# Patient Record
Sex: FEMALE | Race: WHITE | NOT HISPANIC OR LATINO | Employment: OTHER | RURAL
[De-identification: names, ages, dates, MRNs, and addresses within clinical notes are randomized per-mention and may not be internally consistent; named-entity substitution may affect disease eponyms.]

---

## 2016-10-24 LAB — CRC RECOMMENDATION EXT: NORMAL

## 2017-01-04 LAB — BMD RECOMMENDATION EXT: NORMAL

## 2021-01-28 ENCOUNTER — HISTORICAL (OUTPATIENT)
Dept: ADMINISTRATIVE | Facility: HOSPITAL | Age: 71
End: 2021-01-28

## 2021-08-26 RX ORDER — PROPRANOLOL HYDROCHLORIDE 120 MG/1
120 CAPSULE, EXTENDED RELEASE ORAL DAILY
Qty: 90 CAPSULE | Refills: 1 | Status: SHIPPED | OUTPATIENT
Start: 2021-08-26 | End: 2021-11-08 | Stop reason: SDUPTHER

## 2021-08-26 RX ORDER — LEVOTHYROXINE SODIUM 100 UG/1
100 TABLET ORAL DAILY
COMMUNITY
Start: 2021-05-16 | End: 2021-08-26 | Stop reason: SDUPTHER

## 2021-08-26 RX ORDER — PROPRANOLOL HYDROCHLORIDE 120 MG/1
120 CAPSULE, EXTENDED RELEASE ORAL DAILY
COMMUNITY
Start: 2021-05-16 | End: 2021-08-26 | Stop reason: SDUPTHER

## 2021-08-26 RX ORDER — ATORVASTATIN CALCIUM 10 MG/1
10 TABLET, FILM COATED ORAL DAILY
COMMUNITY
Start: 2021-06-21 | End: 2021-08-26 | Stop reason: SDUPTHER

## 2021-08-26 RX ORDER — ATORVASTATIN CALCIUM 10 MG/1
10 TABLET, FILM COATED ORAL DAILY
Qty: 90 TABLET | Refills: 1 | Status: SHIPPED | OUTPATIENT
Start: 2021-08-26 | End: 2021-11-08 | Stop reason: SDUPTHER

## 2021-08-26 RX ORDER — LEVOTHYROXINE SODIUM 100 UG/1
100 TABLET ORAL DAILY
Qty: 90 TABLET | Refills: 1 | Status: SHIPPED | OUTPATIENT
Start: 2021-08-26 | End: 2021-11-08 | Stop reason: SDUPTHER

## 2021-11-08 ENCOUNTER — OFFICE VISIT (OUTPATIENT)
Dept: FAMILY MEDICINE | Facility: CLINIC | Age: 71
End: 2021-11-08
Payer: MEDICARE

## 2021-11-08 VITALS
BODY MASS INDEX: 23.59 KG/M2 | OXYGEN SATURATION: 100 % | SYSTOLIC BLOOD PRESSURE: 122 MMHG | WEIGHT: 155.63 LBS | HEART RATE: 65 BPM | HEIGHT: 68 IN | DIASTOLIC BLOOD PRESSURE: 76 MMHG

## 2021-11-08 DIAGNOSIS — I10 HYPERTENSION, UNSPECIFIED TYPE: Primary | ICD-10-CM

## 2021-11-08 DIAGNOSIS — Z12.11 SCREEN FOR COLON CANCER: ICD-10-CM

## 2021-11-08 DIAGNOSIS — E78.5 HYPERLIPIDEMIA, UNSPECIFIED HYPERLIPIDEMIA TYPE: ICD-10-CM

## 2021-11-08 DIAGNOSIS — E03.9 HYPOTHYROIDISM, UNSPECIFIED TYPE: ICD-10-CM

## 2021-11-08 LAB
ALBUMIN SERPL BCP-MCNC: 4.2 G/DL (ref 3.5–5)
ALBUMIN/GLOB SERPL: 1.2 {RATIO}
ALP SERPL-CCNC: 51 U/L (ref 55–142)
ALT SERPL W P-5'-P-CCNC: 20 U/L (ref 13–56)
ANION GAP SERPL CALCULATED.3IONS-SCNC: 8 MMOL/L (ref 7–16)
AST SERPL W P-5'-P-CCNC: 15 U/L (ref 15–37)
BASOPHILS # BLD AUTO: 0.15 K/UL (ref 0–0.2)
BASOPHILS NFR BLD AUTO: 2.3 % (ref 0–1)
BILIRUB SERPL-MCNC: 0.3 MG/DL (ref 0–1.2)
BUN SERPL-MCNC: 8 MG/DL (ref 7–18)
BUN/CREAT SERPL: 10 (ref 6–20)
CALCIUM SERPL-MCNC: 10.3 MG/DL (ref 8.5–10.1)
CHLORIDE SERPL-SCNC: 104 MMOL/L (ref 98–107)
CHOLEST SERPL-MCNC: 161 MG/DL (ref 0–200)
CHOLEST/HDLC SERPL: 1.7 {RATIO}
CO2 SERPL-SCNC: 31 MMOL/L (ref 21–32)
CREAT SERPL-MCNC: 0.78 MG/DL (ref 0.55–1.02)
DIFFERENTIAL METHOD BLD: ABNORMAL
EOSINOPHIL # BLD AUTO: 0.2 K/UL (ref 0–0.5)
EOSINOPHIL NFR BLD AUTO: 3.1 % (ref 1–4)
ERYTHROCYTE [DISTWIDTH] IN BLOOD BY AUTOMATED COUNT: 12.6 % (ref 11.5–14.5)
GLOBULIN SER-MCNC: 3.5 G/DL (ref 2–4)
GLUCOSE SERPL-MCNC: 96 MG/DL (ref 74–106)
HCT VFR BLD AUTO: 42.5 % (ref 38–47)
HDLC SERPL-MCNC: 97 MG/DL (ref 40–60)
HGB BLD-MCNC: 13.7 G/DL (ref 12–16)
IMM GRANULOCYTES # BLD AUTO: 0.01 K/UL (ref 0–0.04)
IMM GRANULOCYTES NFR BLD: 0.2 % (ref 0–0.4)
LDLC SERPL CALC-MCNC: 48 MG/DL
LDLC/HDLC SERPL: 0.5 {RATIO}
LYMPHOCYTES # BLD AUTO: 1.87 K/UL (ref 1–4.8)
LYMPHOCYTES NFR BLD AUTO: 29.2 % (ref 27–41)
MCH RBC QN AUTO: 30.6 PG (ref 27–31)
MCHC RBC AUTO-ENTMCNC: 32.2 G/DL (ref 32–36)
MCV RBC AUTO: 95.1 FL (ref 80–96)
MONOCYTES # BLD AUTO: 0.58 K/UL (ref 0–0.8)
MONOCYTES NFR BLD AUTO: 9.1 % (ref 2–6)
MPC BLD CALC-MCNC: 12.7 FL (ref 9.4–12.4)
NEUTROPHILS # BLD AUTO: 3.59 K/UL (ref 1.8–7.7)
NEUTROPHILS NFR BLD AUTO: 56.1 % (ref 53–65)
NONHDLC SERPL-MCNC: 64 MG/DL
NRBC # BLD AUTO: 0 X10E3/UL
NRBC, AUTO (.00): 0 %
PLATELET # BLD AUTO: 283 K/UL (ref 150–400)
POTASSIUM SERPL-SCNC: 5 MMOL/L (ref 3.5–5.1)
PROT SERPL-MCNC: 7.7 G/DL (ref 6.4–8.2)
RBC # BLD AUTO: 4.47 M/UL (ref 4.2–5.4)
SODIUM SERPL-SCNC: 138 MMOL/L (ref 136–145)
TRIGL SERPL-MCNC: 78 MG/DL (ref 35–150)
VLDLC SERPL-MCNC: 16 MG/DL
WBC # BLD AUTO: 6.4 K/UL (ref 4.5–11)

## 2021-11-08 PROCEDURE — 82570 ASSAY OF URINE CREATININE: CPT | Mod: ,,, | Performed by: CLINICAL MEDICAL LABORATORY

## 2021-11-08 PROCEDURE — 80053 COMPREHENSIVE METABOLIC PANEL: ICD-10-PCS | Mod: ,,, | Performed by: CLINICAL MEDICAL LABORATORY

## 2021-11-08 PROCEDURE — 82043 UR ALBUMIN QUANTITATIVE: CPT | Mod: ,,, | Performed by: CLINICAL MEDICAL LABORATORY

## 2021-11-08 PROCEDURE — 80061 LIPID PANEL: CPT | Mod: ,,, | Performed by: CLINICAL MEDICAL LABORATORY

## 2021-11-08 PROCEDURE — 85025 CBC WITH DIFFERENTIAL: ICD-10-PCS | Mod: ,,, | Performed by: CLINICAL MEDICAL LABORATORY

## 2021-11-08 PROCEDURE — 85025 COMPLETE CBC W/AUTO DIFF WBC: CPT | Mod: ,,, | Performed by: CLINICAL MEDICAL LABORATORY

## 2021-11-08 PROCEDURE — 99214 PR OFFICE/OUTPT VISIT, EST, LEVL IV, 30-39 MIN: ICD-10-PCS | Mod: ,,, | Performed by: NURSE PRACTITIONER

## 2021-11-08 PROCEDURE — 82570 MICROALBUMIN / CREATININE RATIO URINE: ICD-10-PCS | Mod: ,,, | Performed by: CLINICAL MEDICAL LABORATORY

## 2021-11-08 PROCEDURE — 80061 LIPID PANEL: ICD-10-PCS | Mod: ,,, | Performed by: CLINICAL MEDICAL LABORATORY

## 2021-11-08 PROCEDURE — 99214 OFFICE O/P EST MOD 30 MIN: CPT | Mod: ,,, | Performed by: NURSE PRACTITIONER

## 2021-11-08 PROCEDURE — 82043 MICROALBUMIN / CREATININE RATIO URINE: ICD-10-PCS | Mod: ,,, | Performed by: CLINICAL MEDICAL LABORATORY

## 2021-11-08 PROCEDURE — 80053 COMPREHEN METABOLIC PANEL: CPT | Mod: ,,, | Performed by: CLINICAL MEDICAL LABORATORY

## 2021-11-08 RX ORDER — SUMATRIPTAN SUCCINATE 100 MG/1
TABLET ORAL
COMMUNITY
Start: 2021-11-03 | End: 2021-11-08 | Stop reason: SDUPTHER

## 2021-11-08 RX ORDER — ERGOCALCIFEROL 1.25 MG/1
50000 CAPSULE ORAL
Qty: 12 CAPSULE | Refills: 1 | Status: SHIPPED | OUTPATIENT
Start: 2021-11-08 | End: 2022-01-01 | Stop reason: SDUPTHER

## 2021-11-08 RX ORDER — ATORVASTATIN CALCIUM 10 MG/1
10 TABLET, FILM COATED ORAL DAILY
Qty: 90 TABLET | Refills: 1 | Status: SHIPPED | OUTPATIENT
Start: 2021-11-08 | End: 2022-04-06 | Stop reason: SDUPTHER

## 2021-11-08 RX ORDER — SUMATRIPTAN SUCCINATE 100 MG/1
100 TABLET ORAL
Qty: 18 TABLET | Refills: 5 | Status: SHIPPED | OUTPATIENT
Start: 2021-11-08 | End: 2022-01-31

## 2021-11-08 RX ORDER — LEVOTHYROXINE SODIUM 100 UG/1
100 TABLET ORAL DAILY
Qty: 90 TABLET | Refills: 1 | Status: SHIPPED | OUTPATIENT
Start: 2021-11-08 | End: 2022-04-06 | Stop reason: SDUPTHER

## 2021-11-08 RX ORDER — PROPRANOLOL HYDROCHLORIDE 120 MG/1
120 CAPSULE, EXTENDED RELEASE ORAL DAILY
Qty: 90 CAPSULE | Refills: 1 | Status: SHIPPED | OUTPATIENT
Start: 2021-11-08 | End: 2022-04-06 | Stop reason: SDUPTHER

## 2021-11-09 LAB
CREAT UR-MCNC: 14 MG/DL (ref 28–219)
MICROALBUMIN UR-MCNC: <0.5 MG/DL (ref 0–2.8)
MICROALBUMIN/CREAT RATIO PNL UR: <35.7 MG/G (ref 0–30)

## 2021-12-14 ENCOUNTER — TELEPHONE (OUTPATIENT)
Dept: FAMILY MEDICINE | Facility: CLINIC | Age: 71
End: 2021-12-14
Payer: MEDICARE

## 2021-12-14 NOTE — TELEPHONE ENCOUNTER
----- Message from Shaye Castellon RN sent at 12/13/2021 12:29 PM CST -----    ----- Message -----  From: Nova Diggs  Sent: 12/13/2021  10:28 AM CST  To: Aidan LOU Staff    Pt isnt due for screening colonoscopy until 10/26.

## 2022-02-01 ENCOUNTER — HOSPITAL ENCOUNTER (OUTPATIENT)
Dept: RADIOLOGY | Facility: HOSPITAL | Age: 72
Discharge: HOME | End: 2022-02-01
Payer: MEDICARE

## 2022-02-01 DIAGNOSIS — Z12.31 VISIT FOR SCREENING MAMMOGRAM: ICD-10-CM

## 2022-02-01 PROCEDURE — 77067 SCR MAMMO BI INCL CAD: CPT | Mod: TC

## 2022-03-11 DIAGNOSIS — Z71.89 COMPLEX CARE COORDINATION: ICD-10-CM

## 2022-04-06 RX ORDER — ERGOCALCIFEROL 1.25 MG/1
50000 CAPSULE ORAL
Qty: 12 CAPSULE | Refills: 1 | Status: SHIPPED | OUTPATIENT
Start: 2022-04-06 | End: 2022-05-10 | Stop reason: SDUPTHER

## 2022-04-06 RX ORDER — PROPRANOLOL HYDROCHLORIDE 120 MG/1
120 CAPSULE, EXTENDED RELEASE ORAL DAILY
Qty: 90 CAPSULE | Refills: 1 | Status: SHIPPED | OUTPATIENT
Start: 2022-04-06 | End: 2022-05-10 | Stop reason: SDUPTHER

## 2022-04-06 RX ORDER — LEVOTHYROXINE SODIUM 100 UG/1
100 TABLET ORAL DAILY
Qty: 90 TABLET | Refills: 1 | Status: SHIPPED | OUTPATIENT
Start: 2022-04-06 | End: 2022-05-10 | Stop reason: SDUPTHER

## 2022-04-06 RX ORDER — ATORVASTATIN CALCIUM 10 MG/1
10 TABLET, FILM COATED ORAL DAILY
Qty: 90 TABLET | Refills: 1 | Status: SHIPPED | OUTPATIENT
Start: 2022-04-06 | End: 2022-05-05 | Stop reason: SDUPTHER

## 2022-04-25 ENCOUNTER — PATIENT OUTREACH (OUTPATIENT)
Dept: FAMILY MEDICINE | Facility: CLINIC | Age: 72
End: 2022-04-25
Payer: MEDICARE

## 2022-04-30 ENCOUNTER — EXTERNAL CHRONIC CARE MANAGEMENT (OUTPATIENT)
Dept: FAMILY MEDICINE | Facility: CLINIC | Age: 72
End: 2022-04-30
Payer: MEDICARE

## 2022-04-30 PROCEDURE — G0511 CCM/BHI BY RHC/FQHC 20MIN MO: HCPCS | Mod: ,,, | Performed by: NURSE PRACTITIONER

## 2022-04-30 PROCEDURE — G0511 PR CHRONIC CARE MGMT, RHC OR FQHC ONLY, 20 MINS OR MORE: ICD-10-PCS | Mod: ,,, | Performed by: NURSE PRACTITIONER

## 2022-05-10 ENCOUNTER — OFFICE VISIT (OUTPATIENT)
Dept: FAMILY MEDICINE | Facility: CLINIC | Age: 72
End: 2022-05-10
Payer: MEDICARE

## 2022-05-10 VITALS
SYSTOLIC BLOOD PRESSURE: 110 MMHG | OXYGEN SATURATION: 96 % | RESPIRATION RATE: 20 BRPM | WEIGHT: 146 LBS | DIASTOLIC BLOOD PRESSURE: 80 MMHG | HEIGHT: 68 IN | HEART RATE: 64 BPM | BODY MASS INDEX: 22.13 KG/M2

## 2022-05-10 DIAGNOSIS — Z87.891 PERSONAL HISTORY OF NICOTINE DEPENDENCE: ICD-10-CM

## 2022-05-10 DIAGNOSIS — Z00.00 ENCOUNTER FOR PREVENTIVE HEALTH EXAMINATION: ICD-10-CM

## 2022-05-10 DIAGNOSIS — Z90.710 H/O TOTAL HYSTERECTOMY: ICD-10-CM

## 2022-05-10 DIAGNOSIS — I10 HYPERTENSION, UNSPECIFIED TYPE: Primary | ICD-10-CM

## 2022-05-10 DIAGNOSIS — E03.9 HYPOTHYROIDISM, UNSPECIFIED TYPE: ICD-10-CM

## 2022-05-10 DIAGNOSIS — Z72.0 TOBACCO USE: ICD-10-CM

## 2022-05-10 DIAGNOSIS — E78.5 HYPERLIPIDEMIA, UNSPECIFIED HYPERLIPIDEMIA TYPE: ICD-10-CM

## 2022-05-10 DIAGNOSIS — Z11.59 SCREENING FOR VIRAL DISEASE: ICD-10-CM

## 2022-05-10 DIAGNOSIS — M85.89 OTHER SPECIFIED DISORDERS OF BONE DENSITY AND STRUCTURE, MULTIPLE SITES: ICD-10-CM

## 2022-05-10 PROCEDURE — G0439 PR MEDICARE ANNUAL WELLNESS SUBSEQUENT VISIT: ICD-10-PCS | Mod: ,,, | Performed by: NURSE PRACTITIONER

## 2022-05-10 PROCEDURE — G0439 PPPS, SUBSEQ VISIT: HCPCS | Mod: ,,, | Performed by: NURSE PRACTITIONER

## 2022-05-10 RX ORDER — ERGOCALCIFEROL 1.25 MG/1
50000 CAPSULE ORAL
Qty: 12 CAPSULE | Refills: 1 | Status: SHIPPED | OUTPATIENT
Start: 2022-05-10 | End: 2022-10-24 | Stop reason: SDUPTHER

## 2022-05-10 RX ORDER — PROPRANOLOL HYDROCHLORIDE 120 MG/1
120 CAPSULE, EXTENDED RELEASE ORAL DAILY
Qty: 90 CAPSULE | Refills: 1 | Status: SHIPPED | OUTPATIENT
Start: 2022-05-10 | End: 2022-11-16 | Stop reason: SDUPTHER

## 2022-05-10 RX ORDER — LEVOTHYROXINE SODIUM 100 UG/1
100 TABLET ORAL DAILY
Qty: 90 TABLET | Refills: 1 | Status: SHIPPED | OUTPATIENT
Start: 2022-05-10 | End: 2022-11-16 | Stop reason: SDUPTHER

## 2022-05-10 RX ORDER — ATORVASTATIN CALCIUM 10 MG/1
10 TABLET, FILM COATED ORAL DAILY
Qty: 90 TABLET | Refills: 1 | Status: SHIPPED | OUTPATIENT
Start: 2022-05-10 | End: 2022-11-03

## 2022-05-10 RX ORDER — SUMATRIPTAN SUCCINATE 100 MG/1
TABLET ORAL
Qty: 18 TABLET | Refills: 0 | Status: SHIPPED | OUTPATIENT
Start: 2022-05-10 | End: 2022-06-29 | Stop reason: SDUPTHER

## 2022-05-10 NOTE — PROGRESS NOTES
[place] [facility]       PATIENT NAME: Norma Lopez   : 1950    AGE: 72 y.o. DATE: 2022   MRN: [id number]        Reason for Visit / Chief Complaint: Medicare AWV Follow Up (MEDICARE WELLNESS SUBSEQUENT VISIT)        Norma Lopez presents for an Subsequent Medicare AWV today.     The following components were reviewed and updated:    Medical/Social/Family History:  Past Medical History:   Diagnosis Date    Hypertension     Migraines         Family History   Problem Relation Age of Onset    Diabetes Mother     Stroke Father     Cancer Brother         Social History     Tobacco Use   Smoking Status Former Smoker    Packs/day: 0.50    Years: 49.00    Pack years: 24.50   Smokeless Tobacco Never Used   Tobacco Comment    Quit 9 years      Social History     Substance and Sexual Activity   Alcohol Use Not Currently    Comment: Quit 30 years ago       Family History   Problem Relation Age of Onset    Diabetes Mother     Stroke Father     Cancer Brother        Past Surgical History:   Procedure Laterality Date    HYSTERECTOMY           · Allergies and Current Medications   Review of patient's allergies indicates:  No Known Allergies    Current Outpatient Medications:     atorvastatin (LIPITOR) 10 MG tablet, Take 1 tablet (10 mg total) by mouth once daily., Disp: 90 tablet, Rfl: 1    ergocalciferol (ERGOCALCIFEROL) 50,000 unit Cap, Take 1 capsule (50,000 Units total) by mouth every 7 days., Disp: 12 capsule, Rfl: 1    levothyroxine (SYNTHROID) 100 MCG tablet, Take 1 tablet (100 mcg total) by mouth once daily., Disp: 90 tablet, Rfl: 1    propranoloL (INDERAL LA) 120 MG 24 hr capsule, Take 1 capsule (120 mg total) by mouth once daily., Disp: 90 capsule, Rfl: 1    sumatriptan (IMITREX) 100 MG tablet, TAKE ONE TABLET BY MOUTH AT ONSET OF MIGRAINE. IF SYMPTOMS PERSIST, A SECOND DOSE MAY BE TAKEN IN 2 HOURS IF NEEDED., Disp: 18 tablet, Rfl:  0    · Health Risk Assessment   Fall Risk: No   Obesity: BMI 22.20     Advance Directive:   X  Patient has advanced directives written and agrees to provide copies to the institution.   Depression: PHQ9 -0    HTN: 110/80   T2DM: N/A    Tobacco use: Denies tobacco use currently. Past tobacco user  STI: Not at risk    Alcohol misuse: Denies alcohol use Cage Score: N/A   Statin Use: Continue statin use. Encouraged heart healthy diet & exercise   Mini Co/      · Health Risk Assessment  What is your age?: 70-79  Are you male or female?: Female  During the past four weeks, how much have you been bothered by emotional problems such as feeling anxious, depressed, irritable, sad, or downhearted and blue?: Not at all  During the past five weeks, has your physical and/or emotional health limited your social activities with family, friends, neighbors, or groups?: Slightly  During the past four weeks, how much bodily pain have you generally had?: No pain  During the past four weeks, was someone available to help if you needed and wanted help?: Yes, as much as I wanted  During the past four weeks, what was the hardest physical activity you could do for at least two minutes?: Moderate  Can you get to places out of walking distance without help?  (For example, can you travel alone on buses or taxis, or drive your own car?): Yes  Can you go shopping for groceries or clothes without someone's help?: Yes  Can you prepare your own meals?: Yes  Can you do your own housework without help?: Yes  Because of any health problems, do you need the help of another person with your personal care needs such as eating, bathing, dressing, or getting around the house?: No  Can you handle your own money without help?: Yes  During the past four weeks, how would you rate your health in general?: Very good  How have things been going for you during the past four weeks?: Pretty well  Are you having difficulties driving your car?: No  Do you always  fasten your seat belt when you are in a car?: Yes, usually  How often in the past four weeks have you been bothered by falling or dizzy when standing up?: Never  How often in the past four weeks have you been bothered by sexual problems?: Never  How often in the past four weeks have you been bothered by trouble eating well?: Never  How often in the past four weeks have you been bothered by teeth or denture problems?: Never  How often in the past four weeks have you been bothered with problems using the telephone?: Never  How often in the past four weeks have you been bothered by tiredness or fatigue?: Never  Have you fallen two or more times in the past year?: No  Are you afraid of falling?: No  Are you a smoker?: No  During the past four weeks, how many drinks of wine, beer, or other alcoholic beverages did you have?: No alcohol at all  Do you exercise for about 20 minutes three or more days a week?: No, I usually do not exercise this much  Have you been given any information to help you with hazards in your house that might hurt you?: Yes  Have you been given any information to help you with keeping track of your medications?: Yes  How often do you have trouble taking medicines the way you've been told to take them?: I always take them as prescribed  How confident are you that you can control and manage most of your health problems?: Very confident  What is your race? (Check all that apply.):     · Health Maintenance   Last eye exam: 2 years ago with Dr. Almaguer   Last CV screen with lipids: 11/08/2021   Diabetes screening with fasting glucose or A1c: 11/08/2021   Colonoscopy: 10/24/2016- Repeat in 10 years   Flu Vaccine: 10/04/2021   Pneumonia vaccines: 07/20/2016; 11/07/2013   COVID vaccine: 02/26/2021; 03/26/2021; 11/01/2021; 04/04/2022   Hep B vaccine: Not at risk   DEXA: 01/04/2017- Normal. Due again now. Discussed with pt & ordered   Last pap/pelvic: Total hysterectomy  Last Mammogram: 02/01/2022-  Benign   Last PSA screen: N/A   AAA screening: N/A (once in lifetime for males 65-75 who have smoked > 100 cigarettes in lifetime)  HIV Screeing: N/A  Hepatitis C Screen: Eligible. See standing order for next office visit   Low Dose CT Scan: Eligible. Quit 9 years ago (< than 15). She smoked 0.5 ppd x 49 years= 24.5 pack/year history.  Discussed with pt    Counseling and Shared Decision Making requirements:   * I have discussed the information and determined that the patient meets all of the criteria   *I have counseled the patient on the importance of smoking cessation   *I have provided to the patient smoking cessation counseling / abstinence counseling and provided patient with ways on how to quit   *Previous smoking cessation interventions include N/A .   *I have discussed with the patient the importance of annual screening if the patient continues to meet the above criteria.   * The patient has the following comorbid conditions that I am aware of that may affect screening, further testing, or treatment:     Health Maintenance Topics with due status: Not Due       Topic Last Completion Date    Colorectal Cancer Screening 10/24/2016    Lipid Panel 11/08/2021    Mammogram 02/01/2022     Health Maintenance Due   Topic Date Due    Hepatitis C Screening  Never done    DEXA Scan  01/04/2022        Incontinence  Bowel: No  Bladder: No    Lab results available in Epic or see dates from [facility] above:   Lab Results   Component Value Date    CHOL 161 11/08/2021    CHOL 171 04/21/2021     Lab Results   Component Value Date    HDL 97 (H) 11/08/2021    HDL 97 (H) 04/21/2021     Lab Results   Component Value Date    LDLCALC 48 11/08/2021    LDLCALC 59 04/21/2021     Lab Results   Component Value Date    TRIG 78 11/08/2021    TRIG 77 04/21/2021     Lab Results   Component Value Date    CHOLHDL 1.7 11/08/2021    CHOLHDL 1.8 04/21/2021       No results found for: LABA1C, HGBA1C    Sodium   Date Value Ref Range Status   11/08/2021  "138 136 - 145 mmol/L Final     Potassium   Date Value Ref Range Status   11/08/2021 5.0 3.5 - 5.1 mmol/L Final     Chloride   Date Value Ref Range Status   11/08/2021 104 98 - 107 mmol/L Final     CO2   Date Value Ref Range Status   11/08/2021 31 21 - 32 mmol/L Final     Glucose   Date Value Ref Range Status   11/08/2021 96 74 - 106 mg/dL Final     BUN   Date Value Ref Range Status   11/08/2021 8 7 - 18 mg/dL Final     Creatinine   Date Value Ref Range Status   11/08/2021 0.78 0.55 - 1.02 mg/dL Final     Calcium   Date Value Ref Range Status   11/08/2021 10.3 (H) 8.5 - 10.1 mg/dL Final     Total Protein   Date Value Ref Range Status   11/08/2021 7.7 6.4 - 8.2 g/dL Final     Albumin   Date Value Ref Range Status   11/08/2021 4.2 3.5 - 5.0 g/dL Final     Bilirubin, Total   Date Value Ref Range Status   11/08/2021 0.3 0.0 - 1.2 mg/dL Final     Alk Phos   Date Value Ref Range Status   11/08/2021 51 (L) 55 - 142 U/L Final     AST   Date Value Ref Range Status   11/08/2021 15 15 - 37 U/L Final     ALT   Date Value Ref Range Status   11/08/2021 20 13 - 56 U/L Final     Anion Gap   Date Value Ref Range Status   11/08/2021 8 7 - 16 mmol/L Final     eGFR   Date Value Ref Range Status   11/08/2021 77 >=60 mL/min/1.73m² Final         No results found for: PSA (Delete this line if female pt)        · Care Team   PCP: Janet Bermudez DNP    Eye specialist: Dr. Almaguer       **See Completed Assessments for Annual Wellness visit within the encounter summary    The following assessments were completed & reviewed:  · Depression Screening  · Cognitive function Screening  · Timed Get Up Test  · Whisper Test  · Vision Screen  · Health Risk Assessment  · Checklist of ADLs and IADLs      Objective  Vitals:    05/10/22 1440   BP: 110/80   Pulse: 64   Resp: 20   SpO2: 96%   Weight: 66.2 kg (146 lb)   Height: 5' 8" (1.727 m)   PainSc: 0-No pain      Body mass index is 22.2 kg/m².  Ideal body weight: 63.9 kg (140 lb 14 oz)       Physical " Exam      Assessment:     1. Screening for viral disease  - Hepatitis C Antibody; Future    2. Other specified disorders of bone density and structure, multiple sites  - DXA Bone Density Spine And Hip; Future    3. Hypertension, unspecified type    4. Hyperlipidemia, unspecified hyperlipidemia type    5. Hypothyroidism, unspecified type    6. BMI 22.0-22.9, adult    7. Encounter for preventive health examination    8. Tobacco use  - CT Chest Lung Screening Low Dose; Future    9. Personal history of nicotine dependence   - CT Chest Lung Screening Low Dose; Future         Plan:    Referrals/Orders:  Dexascan, Low Dose Lung CT Screen, Boostrix     Advised to call office if does not hear from anyone with referral appt within 2-3 weeks to check on status of referral. Voiced understanding.    Keep current on appts & continue medications as ordered. Go to pharmacy to get TDAP vaccine      Discussed and provided with a screening schedule and personal prevention plan in accordance with USPSTF age appropriate recommendations and Medicare screening guidelines.   Education, counseling, and referrals were provided as needed.  After Visit Summary printed and given to patient which includes written education and a list of any referrals if indicated. All education discussed with patient & handouts given to patient.      F/u plan for yearly AWV.

## 2022-05-10 NOTE — PATIENT INSTRUCTIONS
Counseling and Referral of Other Preventative  (Italic type indicates deductible and co-insurance are waived)    Patient Name: Norma Lopez  Today's Date: 5/10/2022    Health Maintenance       Date Due Completion Date    Hepatitis C Screening Never done ---    Shingles Vaccine (1 of 2) Never done ---    DEXA Scan 01/04/2022 1/4/2017    TETANUS VACCINE 05/10/2023 (Originally 3/28/1968) ---    Mammogram 02/01/2023 2/1/2022    Colorectal Cancer Screening 10/24/2026 10/24/2016    Lipid Panel 11/08/2026 11/8/2021        Orders Placed This Encounter   Procedures    DXA Bone Density Spine And Hip    Hepatitis C Antibody     The following information is provided to all patients.  This information is to help you find resources for any of the problems found today that may be affecting your health:                Living healthy guide: www.[facility].[place].gov      Understanding Diabetes: www.diabetes.org      Eating healthy: www.cdc.gov/healthyweight      CDC home safety checklist: www.cdc.gov/steadi/patient.html      Agency on Aging: www.goea.[place].[facility] [address] [facility]      Alcoholics anonymous (AA): www.aa.org      Physical Activity: www.jennie.nih.gov/wp5pczt      Tobacco use: www.quitwithusla.org

## 2022-05-31 ENCOUNTER — EXTERNAL CHRONIC CARE MANAGEMENT (OUTPATIENT)
Dept: FAMILY MEDICINE | Facility: CLINIC | Age: 72
End: 2022-05-31
Payer: MEDICARE

## 2022-05-31 PROCEDURE — G0511 CCM/BHI BY RHC/FQHC 20MIN MO: HCPCS | Mod: ,,, | Performed by: NURSE PRACTITIONER

## 2022-05-31 PROCEDURE — G0511 PR CHRONIC CARE MGMT, RHC OR FQHC ONLY, 20 MINS OR MORE: ICD-10-PCS | Mod: ,,, | Performed by: NURSE PRACTITIONER

## 2022-06-09 ENCOUNTER — HOSPITAL ENCOUNTER (OUTPATIENT)
Dept: RADIOLOGY | Facility: HOSPITAL | Age: 72
Discharge: HOME | End: 2022-06-09
Attending: NURSE PRACTITIONER
Payer: MEDICARE

## 2022-06-09 DIAGNOSIS — M85.89 OTHER SPECIFIED DISORDERS OF BONE DENSITY AND STRUCTURE, MULTIPLE SITES: ICD-10-CM

## 2022-06-09 PROCEDURE — 77080 DEXA BONE DENSITY SPINE HIP: ICD-10-PCS | Mod: 26,,, | Performed by: RADIOLOGY

## 2022-06-09 PROCEDURE — 77080 DXA BONE DENSITY AXIAL: CPT | Mod: 26,,, | Performed by: RADIOLOGY

## 2022-06-09 PROCEDURE — 77080 DXA BONE DENSITY AXIAL: CPT | Mod: TC

## 2022-06-29 ENCOUNTER — OFFICE VISIT (OUTPATIENT)
Dept: FAMILY MEDICINE | Facility: CLINIC | Age: 72
End: 2022-06-29
Payer: MEDICARE

## 2022-06-29 VITALS
OXYGEN SATURATION: 100 % | HEART RATE: 64 BPM | BODY MASS INDEX: 22.07 KG/M2 | HEIGHT: 68 IN | WEIGHT: 145.63 LBS | TEMPERATURE: 97 F | SYSTOLIC BLOOD PRESSURE: 149 MMHG | DIASTOLIC BLOOD PRESSURE: 64 MMHG

## 2022-06-29 DIAGNOSIS — R05.9 COUGH: ICD-10-CM

## 2022-06-29 DIAGNOSIS — J32.9 SINUSITIS, UNSPECIFIED CHRONICITY, UNSPECIFIED LOCATION: Primary | ICD-10-CM

## 2022-06-29 DIAGNOSIS — J02.9 SORE THROAT: ICD-10-CM

## 2022-06-29 LAB
CTP QC/QA: YES
FLUAV AG NPH QL: NEGATIVE
FLUBV AG NPH QL: NEGATIVE
SARS-COV-2 AG RESP QL IA.RAPID: NEGATIVE

## 2022-06-29 PROCEDURE — 87428 SARSCOV & INF VIR A&B AG IA: CPT | Mod: RHCUB | Performed by: NURSE PRACTITIONER

## 2022-06-29 PROCEDURE — 99213 OFFICE O/P EST LOW 20 MIN: CPT | Mod: ,,, | Performed by: NURSE PRACTITIONER

## 2022-06-29 PROCEDURE — 99213 PR OFFICE/OUTPT VISIT, EST, LEVL III, 20-29 MIN: ICD-10-PCS | Mod: ,,, | Performed by: NURSE PRACTITIONER

## 2022-06-29 PROCEDURE — 96372 THER/PROPH/DIAG INJ SC/IM: CPT | Mod: ,,, | Performed by: NURSE PRACTITIONER

## 2022-06-29 PROCEDURE — 96372 PR INJECTION,THERAP/PROPH/DIAG2ST, IM OR SUBCUT: ICD-10-PCS | Mod: ,,, | Performed by: NURSE PRACTITIONER

## 2022-06-29 RX ORDER — CEFDINIR 300 MG/1
300 CAPSULE ORAL 2 TIMES DAILY
Qty: 14 CAPSULE | Refills: 0 | Status: SHIPPED | OUTPATIENT
Start: 2022-06-29 | End: 2022-07-06

## 2022-06-29 RX ORDER — CEFTRIAXONE 1 G/1
1 INJECTION, POWDER, FOR SOLUTION INTRAMUSCULAR; INTRAVENOUS
Status: COMPLETED | OUTPATIENT
Start: 2022-06-29 | End: 2022-06-29

## 2022-06-29 RX ORDER — AZITHROMYCIN 250 MG/1
TABLET, FILM COATED ORAL
Qty: 6 TABLET | Refills: 0 | Status: SHIPPED | OUTPATIENT
Start: 2022-06-29 | End: 2022-07-14 | Stop reason: SDUPTHER

## 2022-06-29 RX ORDER — METHYLPREDNISOLONE 4 MG/1
TABLET ORAL
Qty: 21 TABLET | Refills: 0 | Status: SHIPPED | OUTPATIENT
Start: 2022-06-29 | End: 2022-07-14 | Stop reason: SDUPTHER

## 2022-06-29 RX ORDER — BENZONATATE 100 MG/1
100 CAPSULE ORAL 3 TIMES DAILY PRN
Qty: 30 CAPSULE | Refills: 0 | Status: SHIPPED | OUTPATIENT
Start: 2022-06-29 | End: 2022-07-09

## 2022-06-29 RX ORDER — SUMATRIPTAN SUCCINATE 100 MG/1
TABLET ORAL
Qty: 18 TABLET | Refills: 6 | Status: SHIPPED | OUTPATIENT
Start: 2022-06-29 | End: 2022-07-23 | Stop reason: SDUPTHER

## 2022-06-29 RX ORDER — BETAMETHASONE SODIUM PHOSPHATE AND BETAMETHASONE ACETATE 3; 3 MG/ML; MG/ML
6 INJECTION, SUSPENSION INTRA-ARTICULAR; INTRALESIONAL; INTRAMUSCULAR; SOFT TISSUE
Status: COMPLETED | OUTPATIENT
Start: 2022-06-29 | End: 2022-06-29

## 2022-06-29 RX ADMIN — CEFTRIAXONE 1 G: 1 INJECTION, POWDER, FOR SOLUTION INTRAMUSCULAR; INTRAVENOUS at 11:06

## 2022-06-29 RX ADMIN — BETAMETHASONE SODIUM PHOSPHATE AND BETAMETHASONE ACETATE 6 MG: 3; 3 INJECTION, SUSPENSION INTRA-ARTICULAR; INTRALESIONAL; INTRAMUSCULAR; SOFT TISSUE at 11:06

## 2022-06-29 NOTE — PROGRESS NOTES
Janet Bermudez DNP   [address] [place] [address]     PATIENT NAME: Norma Lopez  : 1950  DATE: 22  MRN: [id number]      Billing Provider: Janet Bermudez DNP  Level of Service:   Patient PCP Information     Provider PCP Type    Janet Bermudez DNP General          Reason for Visit / Chief Complaint: Nasal Congestion, Cough, and chest discomfort       Update PCP  Update Chief Complaint         History of Present Illness / Problem Focused Workflow     Norma Lopez presents to the clinic with Nasal Congestion, Cough, and chest discomfort     HPI    Review of Systems     Review of Systems     Medical / Social / Family History     Past Medical History:   Diagnosis Date    Hypertension     Migraines        Past Surgical History:   Procedure Laterality Date    HYSTERECTOMY         Social History  Ms.  reports that she has quit smoking. She has a 24.50 pack-year smoking history. She has never used smokeless tobacco. She reports previous alcohol use. She reports that she does not use drugs.    Family History  Ms.'s family history includes Cancer in her brother; Diabetes in her mother; Stroke in her father.    Medications and Allergies     Medications  Outpatient Medications Marked as Taking for the 22 encounter (Office Visit) with Janet Bermudez DNP   Medication Sig Dispense Refill    atorvastatin (LIPITOR) 10 MG tablet Take 1 tablet (10 mg total) by mouth once daily. 90 tablet 1    ergocalciferol (ERGOCALCIFEROL) 50,000 unit Cap Take 1 capsule (50,000 Units total) by mouth every 7 days. 12 capsule 1    levothyroxine (SYNTHROID) 100 MCG tablet Take 1 tablet (100 mcg total) by mouth once daily. 90 tablet 1    propranoloL (INDERAL LA) 120 MG 24 hr capsule Take 1 capsule (120 mg total) by mouth once daily. 90 capsule 1    [DISCONTINUED] sumatriptan (IMITREX) 100 MG tablet TAKE ONE TABLET BY MOUTH AT ONSET OF MIGRAINE. IF SYMPTOMS PERSIST, A SECOND DOSE MAY BE TAKEN IN  2 HOURS IF NEEDED. 18 tablet 0     Current Facility-Administered Medications for the 6/29/22 encounter (Office Visit) with Janet Bermudez DNP   Medication Dose Route Frequency Provider Last Rate Last Admin    [COMPLETED] betamethasone acetate-betamethasone sodium phosphate injection 6 mg  6 mg Intramuscular 1 time in Clinic/HOD Janet Bermudez DNP   6 mg at 06/29/22 1107    [COMPLETED] cefTRIAXone injection 1 g  1 g Intramuscular 1 time in Clinic/HOD Janet Bermudez DNP   1 g at 06/29/22 1108       Allergies  Review of patient's allergies indicates:  No Known Allergies    Physical Examination     Vitals:    06/29/22 0956   BP: (!) 149/64   Pulse: 64   Temp: 97.1 °F (36.2 °C)     Physical Exam  Vitals and nursing note reviewed.   Constitutional:       Appearance: She is ill-appearing.   HENT:      Head: Normocephalic.      Nose: Congestion and rhinorrhea present.      Mouth/Throat:      Mouth: Mucous membranes are moist.      Pharynx: Posterior oropharyngeal erythema present.   Eyes:      Extraocular Movements: Extraocular movements intact.      Conjunctiva/sclera: Conjunctivae normal.      Pupils: Pupils are equal, round, and reactive to light.   Cardiovascular:      Rate and Rhythm: Normal rate and regular rhythm.      Pulses: Normal pulses.      Heart sounds: Normal heart sounds.   Pulmonary:      Effort: Pulmonary effort is normal.      Breath sounds: Normal breath sounds.   Musculoskeletal:      Cervical back: Normal range of motion.   Lymphadenopathy:      Cervical: Cervical adenopathy present.   Skin:     General: Skin is warm and dry.      Capillary Refill: Capillary refill takes less than 2 seconds.   Neurological:      General: No focal deficit present.      Mental Status: She is alert and oriented to person, place, and time. Mental status is at baseline.   Psychiatric:         Mood and Affect: Mood normal.         Behavior: Behavior normal.         Thought Content: Thought content normal.          Judgment: Judgment normal.          Assessment and Plan (including Health Maintenance)      Problem List  Smart Sets  Document Outside HM   :    Plan:         Health Maintenance Due   Topic Date Due    Hepatitis C Screening  Never done       Problem List Items Addressed This Visit        ENT    Sinusitis - Primary    Sore throat    Relevant Orders    POCT SARS-COV2 (COVID) with Flu Antigen (Completed)       Pulmonary    Cough    Relevant Orders    POCT SARS-COV2 (COVID) with Flu Antigen (Completed)       Endocrine    BMI 22.0-22.9, adult          Health Maintenance Topics with due status: Not Due       Topic Last Completion Date    Colorectal Cancer Screening 10/24/2016    Lipid Panel 11/08/2021    Mammogram 02/01/2022    DEXA Scan 06/09/2022       Future Appointments   Date Time Provider Department Center   11/16/2022  9:30 AM Janet Bermudez DNP [facility] [place]   2/7/2023 10:45 AM [facility] M[facility]MO1 [facility] [place]   5/11/2023  9:00 AM AWV NURSE, [facility] [place]            Signature:  Janet Bermudez DNP      [address] [place] [address]    Date of encounter: 6/29/22

## 2022-06-30 ENCOUNTER — EXTERNAL CHRONIC CARE MANAGEMENT (OUTPATIENT)
Dept: FAMILY MEDICINE | Facility: CLINIC | Age: 72
End: 2022-06-30
Payer: MEDICARE

## 2022-06-30 PROCEDURE — G0511 PR CHRONIC CARE MGMT, RHC OR FQHC ONLY, 20 MINS OR MORE: ICD-10-PCS | Mod: ,,, | Performed by: NURSE PRACTITIONER

## 2022-06-30 PROCEDURE — G0511 CCM/BHI BY RHC/FQHC 20MIN MO: HCPCS | Mod: ,,, | Performed by: NURSE PRACTITIONER

## 2022-07-14 ENCOUNTER — APPOINTMENT (OUTPATIENT)
Dept: RADIOLOGY | Facility: CLINIC | Age: 72
End: 2022-07-14
Attending: NURSE PRACTITIONER
Payer: MEDICARE

## 2022-07-14 ENCOUNTER — OFFICE VISIT (OUTPATIENT)
Dept: FAMILY MEDICINE | Facility: CLINIC | Age: 72
End: 2022-07-14
Payer: MEDICARE

## 2022-07-14 VITALS
SYSTOLIC BLOOD PRESSURE: 142 MMHG | DIASTOLIC BLOOD PRESSURE: 75 MMHG | TEMPERATURE: 98 F | RESPIRATION RATE: 20 BRPM | HEIGHT: 68 IN | BODY MASS INDEX: 22.13 KG/M2 | WEIGHT: 146 LBS | OXYGEN SATURATION: 99 % | HEART RATE: 176 BPM

## 2022-07-14 DIAGNOSIS — U07.1 COVID-19: Primary | ICD-10-CM

## 2022-07-14 DIAGNOSIS — R05.9 COUGH: ICD-10-CM

## 2022-07-14 DIAGNOSIS — J02.9 SORETHROAT: ICD-10-CM

## 2022-07-14 DIAGNOSIS — R09.89 CHEST CONGESTION: ICD-10-CM

## 2022-07-14 LAB
CTP QC/QA: YES
FLUAV AG NPH QL: NEGATIVE
FLUBV AG NPH QL: POSITIVE
SARS-COV-2 AG RESP QL IA.RAPID: POSITIVE

## 2022-07-14 PROCEDURE — 71046 X-RAY EXAM CHEST 2 VIEWS: CPT | Mod: TC,RHCUB,FY | Performed by: NURSE PRACTITIONER

## 2022-07-14 PROCEDURE — 99213 PR OFFICE/OUTPT VISIT, EST, LEVL III, 20-29 MIN: ICD-10-PCS | Mod: CR,,, | Performed by: NURSE PRACTITIONER

## 2022-07-14 PROCEDURE — 87428 SARSCOV & INF VIR A&B AG IA: CPT | Mod: RHCUB | Performed by: NURSE PRACTITIONER

## 2022-07-14 PROCEDURE — 71046 X-RAY EXAM CHEST 2 VIEWS: CPT | Mod: 26,,, | Performed by: RADIOLOGY

## 2022-07-14 PROCEDURE — 71046 XR CHEST PA AND LATERAL: ICD-10-PCS | Mod: 26,,, | Performed by: RADIOLOGY

## 2022-07-14 PROCEDURE — 99213 OFFICE O/P EST LOW 20 MIN: CPT | Mod: CR,,, | Performed by: NURSE PRACTITIONER

## 2022-07-14 RX ORDER — FLUTICASONE PROPIONATE 50 MCG
1 SPRAY, SUSPENSION (ML) NASAL DAILY
Qty: 16 G | Refills: 0 | Status: SHIPPED | OUTPATIENT
Start: 2022-07-14 | End: 2022-11-16 | Stop reason: ALTCHOICE

## 2022-07-14 RX ORDER — AZITHROMYCIN 250 MG/1
TABLET, FILM COATED ORAL
Qty: 6 TABLET | Refills: 0 | Status: SHIPPED | OUTPATIENT
Start: 2022-07-14 | End: 2022-11-16 | Stop reason: ALTCHOICE

## 2022-07-14 RX ORDER — CETIRIZINE HYDROCHLORIDE 10 MG/1
10 TABLET ORAL DAILY
Qty: 30 TABLET | Refills: 11 | Status: SHIPPED | OUTPATIENT
Start: 2022-07-14 | End: 2022-11-16

## 2022-07-14 RX ORDER — METHYLPREDNISOLONE 4 MG/1
TABLET ORAL
Qty: 21 TABLET | Refills: 0 | Status: SHIPPED | OUTPATIENT
Start: 2022-07-14 | End: 2022-11-16 | Stop reason: ALTCHOICE

## 2022-07-31 ENCOUNTER — EXTERNAL CHRONIC CARE MANAGEMENT (OUTPATIENT)
Dept: FAMILY MEDICINE | Facility: CLINIC | Age: 72
End: 2022-07-31
Payer: MEDICARE

## 2022-07-31 PROCEDURE — G0511 CCM/BHI BY RHC/FQHC 20MIN MO: HCPCS | Mod: ,,, | Performed by: NURSE PRACTITIONER

## 2022-07-31 PROCEDURE — G0511 PR CHRONIC CARE MGMT, RHC OR FQHC ONLY, 20 MINS OR MORE: ICD-10-PCS | Mod: ,,, | Performed by: NURSE PRACTITIONER

## 2022-08-15 PROBLEM — Z00.00 ENCOUNTER FOR PREVENTIVE HEALTH EXAMINATION: Status: RESOLVED | Noted: 2021-11-08 | Resolved: 2022-08-15

## 2022-09-30 ENCOUNTER — EXTERNAL CHRONIC CARE MANAGEMENT (OUTPATIENT)
Dept: FAMILY MEDICINE | Facility: CLINIC | Age: 72
End: 2022-09-30
Payer: MEDICARE

## 2022-09-30 PROCEDURE — G0511 PR CHRONIC CARE MGMT, RHC OR FQHC ONLY, 20 MINS OR MORE: ICD-10-PCS | Mod: ,,, | Performed by: NURSE PRACTITIONER

## 2022-09-30 PROCEDURE — G0511 CCM/BHI BY RHC/FQHC 20MIN MO: HCPCS | Mod: ,,, | Performed by: NURSE PRACTITIONER

## 2022-10-31 ENCOUNTER — EXTERNAL CHRONIC CARE MANAGEMENT (OUTPATIENT)
Dept: FAMILY MEDICINE | Facility: CLINIC | Age: 72
End: 2022-10-31
Payer: MEDICARE

## 2022-10-31 PROCEDURE — G0511 PR CHRONIC CARE MGMT, RHC OR FQHC ONLY, 20 MINS OR MORE: ICD-10-PCS | Mod: ,,, | Performed by: NURSE PRACTITIONER

## 2022-10-31 PROCEDURE — G0511 CCM/BHI BY RHC/FQHC 20MIN MO: HCPCS | Mod: ,,, | Performed by: NURSE PRACTITIONER

## 2022-11-09 RX ORDER — SUMATRIPTAN SUCCINATE 100 MG/1
TABLET ORAL
Qty: 18 TABLET | Refills: 0 | Status: SHIPPED | OUTPATIENT
Start: 2022-11-09 | End: 2022-11-16 | Stop reason: SDUPTHER

## 2022-11-16 ENCOUNTER — OFFICE VISIT (OUTPATIENT)
Dept: FAMILY MEDICINE | Facility: CLINIC | Age: 72
End: 2022-11-16
Payer: MEDICARE

## 2022-11-16 VITALS
HEART RATE: 60 BPM | DIASTOLIC BLOOD PRESSURE: 63 MMHG | SYSTOLIC BLOOD PRESSURE: 137 MMHG | BODY MASS INDEX: 23.16 KG/M2 | WEIGHT: 152.81 LBS | TEMPERATURE: 97 F | OXYGEN SATURATION: 99 % | HEIGHT: 68 IN | RESPIRATION RATE: 18 BRPM

## 2022-11-16 DIAGNOSIS — E03.9 HYPOTHYROIDISM, UNSPECIFIED TYPE: ICD-10-CM

## 2022-11-16 DIAGNOSIS — E78.5 HYPERLIPIDEMIA, UNSPECIFIED HYPERLIPIDEMIA TYPE: ICD-10-CM

## 2022-11-16 DIAGNOSIS — I10 HYPERTENSION, UNSPECIFIED TYPE: Primary | ICD-10-CM

## 2022-11-16 DIAGNOSIS — Z72.0 TOBACCO USE: ICD-10-CM

## 2022-11-16 DIAGNOSIS — R73.9 HYPERGLYCEMIA: ICD-10-CM

## 2022-11-16 DIAGNOSIS — Z11.59 SCREENING FOR VIRAL DISEASE: ICD-10-CM

## 2022-11-16 DIAGNOSIS — Z79.899 LONG TERM CURRENT USE OF THERAPEUTIC DRUG: ICD-10-CM

## 2022-11-16 LAB
25(OH)D3 SERPL-MCNC: 125.8 NG/ML
ALBUMIN SERPL BCP-MCNC: 4 G/DL (ref 3.5–5)
ALBUMIN/GLOB SERPL: 1.3 {RATIO}
ALP SERPL-CCNC: 50 U/L (ref 55–142)
ALT SERPL W P-5'-P-CCNC: 18 U/L (ref 13–56)
ANION GAP SERPL CALCULATED.3IONS-SCNC: 7 MMOL/L (ref 7–16)
AST SERPL W P-5'-P-CCNC: 14 U/L (ref 15–37)
BASOPHILS # BLD AUTO: 0.12 K/UL (ref 0–0.2)
BASOPHILS NFR BLD AUTO: 1.9 % (ref 0–1)
BILIRUB SERPL-MCNC: 0.3 MG/DL (ref ?–1.2)
BUN SERPL-MCNC: 13 MG/DL (ref 7–18)
BUN/CREAT SERPL: 18 (ref 6–20)
CALCIUM SERPL-MCNC: 9.6 MG/DL (ref 8.5–10.1)
CHLORIDE SERPL-SCNC: 103 MMOL/L (ref 98–107)
CHOLEST SERPL-MCNC: 171 MG/DL (ref 0–200)
CHOLEST/HDLC SERPL: 1.9 {RATIO}
CO2 SERPL-SCNC: 32 MMOL/L (ref 21–32)
CREAT SERPL-MCNC: 0.72 MG/DL (ref 0.55–1.02)
CREAT UR-MCNC: 14 MG/DL (ref 28–219)
DIFFERENTIAL METHOD BLD: ABNORMAL
EGFR (NO RACE VARIABLE) (RUSH/TITUS): 88 ML/MIN/1.73M²
EOSINOPHIL # BLD AUTO: 0.14 K/UL (ref 0–0.5)
EOSINOPHIL NFR BLD AUTO: 2.2 % (ref 1–4)
ERYTHROCYTE [DISTWIDTH] IN BLOOD BY AUTOMATED COUNT: 12.2 % (ref 11.5–14.5)
EST. AVERAGE GLUCOSE BLD GHB EST-MCNC: 90 MG/DL
FOLATE SERPL-MCNC: 18.8 NG/ML (ref 3.1–17.5)
GLOBULIN SER-MCNC: 3.1 G/DL (ref 2–4)
GLUCOSE SERPL-MCNC: 88 MG/DL (ref 74–106)
HBA1C MFR BLD HPLC: 5.3 % (ref 4.5–6.6)
HCT VFR BLD AUTO: 40.9 % (ref 38–47)
HCV AB SER QL: NORMAL
HDLC SERPL-MCNC: 92 MG/DL (ref 40–60)
HGB BLD-MCNC: 12.9 G/DL (ref 12–16)
IMM GRANULOCYTES # BLD AUTO: 0.01 K/UL (ref 0–0.04)
IMM GRANULOCYTES NFR BLD: 0.2 % (ref 0–0.4)
LDLC SERPL CALC-MCNC: 62 MG/DL
LYMPHOCYTES # BLD AUTO: 1.68 K/UL (ref 1–4.8)
LYMPHOCYTES NFR BLD AUTO: 26 % (ref 27–41)
MCH RBC QN AUTO: 30.6 PG (ref 27–31)
MCHC RBC AUTO-ENTMCNC: 31.5 G/DL (ref 32–36)
MCV RBC AUTO: 96.9 FL (ref 80–96)
MICROALBUMIN UR-MCNC: <0.5 MG/DL (ref 0–2.8)
MICROALBUMIN/CREAT RATIO PNL UR: <35.7 MG/G (ref 0–30)
MONOCYTES # BLD AUTO: 0.57 K/UL (ref 0–0.8)
MONOCYTES NFR BLD AUTO: 8.8 % (ref 2–6)
MPC BLD CALC-MCNC: 12.5 FL (ref 9.4–12.4)
NEUTROPHILS # BLD AUTO: 3.94 K/UL (ref 1.8–7.7)
NEUTROPHILS NFR BLD AUTO: 60.9 % (ref 53–65)
NONHDLC SERPL-MCNC: 79 MG/DL
NRBC # BLD AUTO: 0 X10E3/UL
NRBC, AUTO (.00): 0 %
PLATELET # BLD AUTO: 284 K/UL (ref 150–400)
POTASSIUM SERPL-SCNC: 4.3 MMOL/L (ref 3.5–5.1)
PROT SERPL-MCNC: 7.1 G/DL (ref 6.4–8.2)
RBC # BLD AUTO: 4.22 M/UL (ref 4.2–5.4)
SODIUM SERPL-SCNC: 138 MMOL/L (ref 136–145)
T4 FREE SERPL-MCNC: 1.28 NG/DL (ref 0.76–1.46)
TRIGL SERPL-MCNC: 87 MG/DL (ref 35–150)
TSH SERPL DL<=0.005 MIU/L-ACNC: 4.7 UIU/ML (ref 0.36–3.74)
VIT B12 SERPL-MCNC: 378 PG/ML (ref 193–986)
VLDLC SERPL-MCNC: 17 MG/DL
WBC # BLD AUTO: 6.46 K/UL (ref 4.5–11)

## 2022-11-16 PROCEDURE — 86803 HEPATITIS C AB TEST: CPT | Mod: ,,, | Performed by: CLINICAL MEDICAL LABORATORY

## 2022-11-16 PROCEDURE — 82570 ASSAY OF URINE CREATININE: CPT | Mod: ,,, | Performed by: CLINICAL MEDICAL LABORATORY

## 2022-11-16 PROCEDURE — 85025 CBC WITH DIFFERENTIAL: ICD-10-PCS | Mod: ,,, | Performed by: CLINICAL MEDICAL LABORATORY

## 2022-11-16 PROCEDURE — 84443 THYROID PANEL: ICD-10-PCS | Mod: ,,, | Performed by: CLINICAL MEDICAL LABORATORY

## 2022-11-16 PROCEDURE — 84439 ASSAY OF FREE THYROXINE: CPT | Mod: ,,, | Performed by: CLINICAL MEDICAL LABORATORY

## 2022-11-16 PROCEDURE — 82043 MICROALBUMIN / CREATININE RATIO URINE: ICD-10-PCS | Mod: ,,, | Performed by: CLINICAL MEDICAL LABORATORY

## 2022-11-16 PROCEDURE — 82607 VITAMIN B12/FOLATE, SERUM PANEL: ICD-10-PCS | Mod: ,,, | Performed by: CLINICAL MEDICAL LABORATORY

## 2022-11-16 PROCEDURE — 84443 ASSAY THYROID STIM HORMONE: CPT | Mod: ,,, | Performed by: CLINICAL MEDICAL LABORATORY

## 2022-11-16 PROCEDURE — 85025 COMPLETE CBC W/AUTO DIFF WBC: CPT | Mod: ,,, | Performed by: CLINICAL MEDICAL LABORATORY

## 2022-11-16 PROCEDURE — 80061 LIPID PANEL: ICD-10-PCS | Mod: ,,, | Performed by: CLINICAL MEDICAL LABORATORY

## 2022-11-16 PROCEDURE — 99214 OFFICE O/P EST MOD 30 MIN: CPT | Mod: ,,, | Performed by: NURSE PRACTITIONER

## 2022-11-16 PROCEDURE — 82570 MICROALBUMIN / CREATININE RATIO URINE: ICD-10-PCS | Mod: ,,, | Performed by: CLINICAL MEDICAL LABORATORY

## 2022-11-16 PROCEDURE — 82306 VITAMIN D: ICD-10-PCS | Mod: ,,, | Performed by: CLINICAL MEDICAL LABORATORY

## 2022-11-16 PROCEDURE — 83036 HEMOGLOBIN A1C: ICD-10-PCS | Mod: ,,, | Performed by: CLINICAL MEDICAL LABORATORY

## 2022-11-16 PROCEDURE — 80061 LIPID PANEL: CPT | Mod: ,,, | Performed by: CLINICAL MEDICAL LABORATORY

## 2022-11-16 PROCEDURE — 82043 UR ALBUMIN QUANTITATIVE: CPT | Mod: ,,, | Performed by: CLINICAL MEDICAL LABORATORY

## 2022-11-16 PROCEDURE — 80053 COMPREHEN METABOLIC PANEL: CPT | Mod: ,,, | Performed by: CLINICAL MEDICAL LABORATORY

## 2022-11-16 PROCEDURE — 86803 HEPATITIS C ANTIBODY: ICD-10-PCS | Mod: ,,, | Performed by: CLINICAL MEDICAL LABORATORY

## 2022-11-16 PROCEDURE — 82746 ASSAY OF FOLIC ACID SERUM: CPT | Mod: ,,, | Performed by: CLINICAL MEDICAL LABORATORY

## 2022-11-16 PROCEDURE — 83036 HEMOGLOBIN GLYCOSYLATED A1C: CPT | Mod: ,,, | Performed by: CLINICAL MEDICAL LABORATORY

## 2022-11-16 PROCEDURE — 82746 VITAMIN B12/FOLATE, SERUM PANEL: ICD-10-PCS | Mod: ,,, | Performed by: CLINICAL MEDICAL LABORATORY

## 2022-11-16 PROCEDURE — 82607 VITAMIN B-12: CPT | Mod: ,,, | Performed by: CLINICAL MEDICAL LABORATORY

## 2022-11-16 PROCEDURE — 80053 COMPREHENSIVE METABOLIC PANEL: ICD-10-PCS | Mod: ,,, | Performed by: CLINICAL MEDICAL LABORATORY

## 2022-11-16 PROCEDURE — 82306 VITAMIN D 25 HYDROXY: CPT | Mod: ,,, | Performed by: CLINICAL MEDICAL LABORATORY

## 2022-11-16 PROCEDURE — 84439 THYROID PANEL: ICD-10-PCS | Mod: ,,, | Performed by: CLINICAL MEDICAL LABORATORY

## 2022-11-16 PROCEDURE — 99214 PR OFFICE/OUTPT VISIT, EST, LEVL IV, 30-39 MIN: ICD-10-PCS | Mod: ,,, | Performed by: NURSE PRACTITIONER

## 2022-11-16 RX ORDER — PROPRANOLOL HYDROCHLORIDE 120 MG/1
120 CAPSULE, EXTENDED RELEASE ORAL DAILY
Qty: 90 CAPSULE | Refills: 3 | Status: SHIPPED | OUTPATIENT
Start: 2022-11-16 | End: 2023-01-30

## 2022-11-16 RX ORDER — LEVOTHYROXINE SODIUM 100 UG/1
100 TABLET ORAL DAILY
Qty: 90 TABLET | Refills: 3 | Status: SHIPPED | OUTPATIENT
Start: 2022-11-16 | End: 2023-01-30

## 2022-11-16 RX ORDER — SUMATRIPTAN SUCCINATE 100 MG/1
TABLET ORAL
Qty: 18 TABLET | Refills: 11 | Status: SHIPPED | OUTPATIENT
Start: 2022-11-16 | End: 2023-12-13

## 2022-11-16 RX ORDER — ATORVASTATIN CALCIUM 10 MG/1
10 TABLET, FILM COATED ORAL DAILY
Qty: 90 TABLET | Refills: 3 | Status: SHIPPED | OUTPATIENT
Start: 2022-11-16 | End: 2024-01-10 | Stop reason: SDUPTHER

## 2022-11-16 RX ORDER — ERGOCALCIFEROL 1.25 MG/1
50000 CAPSULE ORAL
Qty: 12 CAPSULE | Refills: 3 | Status: SHIPPED | OUTPATIENT
Start: 2022-11-16 | End: 2024-01-10 | Stop reason: SDUPTHER

## 2022-11-16 NOTE — PROGRESS NOTES
Janet Bermudez DNP   [address] [place] [address]     PATIENT NAME: Norma Lopez  : 1950  DATE: 22  MRN: [id number]      Billing Provider: Janet Bermudez DNP  Level of Service:   Patient PCP Information       Provider PCP Type    Janet Bermudez DNP General            Reason for Visit / Chief Complaint: follow up appt       Update PCP  Update Chief Complaint         History of Present Illness / Problem Focused Workflow     Norma Lopez presents to the clinic with No chief complaint on file.     HPI    Review of Systems     Review of Systems     Medical / Social / Family History     Past Medical History:   Diagnosis Date    Hypertension     Migraines        Past Surgical History:   Procedure Laterality Date    HYSTERECTOMY         Social History  Ms.  reports that she has quit smoking. She has a 24.50 pack-year smoking history. She has never used smokeless tobacco. She reports that she does not currently use alcohol. She reports that she does not use drugs.    Family History  Ms.'s family history includes Cancer in her brother; Diabetes in her mother; Stroke in her father.    Medications and Allergies     Medications  No outpatient medications have been marked as taking for the 22 encounter (Appointment) with Janet Bermudez DNP.       Allergies  Review of patient's allergies indicates:  No Known Allergies    Physical Examination   There were no vitals taken for this visit.   Physical Exam  Vitals and nursing note reviewed.   Constitutional:       Appearance: Normal appearance. She is normal weight.   HENT:      Head: Normocephalic.      Nose: Nose normal.      Mouth/Throat:      Mouth: Mucous membranes are moist.   Eyes:      Pupils: Pupils are equal, round, and reactive to light.   Cardiovascular:      Rate and Rhythm: Normal rate and regular rhythm.      Pulses: Normal pulses.      Heart sounds: Normal heart sounds.   Pulmonary:      Effort: Pulmonary effort  is normal.      Breath sounds: Normal breath sounds.   Abdominal:      General: Abdomen is flat. Bowel sounds are normal.      Palpations: Abdomen is soft.   Musculoskeletal:         General: Normal range of motion.      Cervical back: Normal range of motion and neck supple.   Skin:     General: Skin is warm and dry.      Capillary Refill: Capillary refill takes less than 2 seconds.   Neurological:      General: No focal deficit present.      Mental Status: She is alert and oriented to person, place, and time. Mental status is at baseline.   Psychiatric:         Mood and Affect: Mood normal.         Behavior: Behavior normal.         Thought Content: Thought content normal.         Judgment: Judgment normal.        Assessment and Plan (including Health Maintenance)      Problem List  Smart Sets  Document Outside HM   :    Plan:     here for f/u appt     Continue current meds  Had LELIA/BSO in 89 and doesn't want paps.      c'scopy and egd in 10/2016 dr aguayo recc 10 years.. due 2026  MMG 2/2022  Dexa 6/2022 osical biflex      Will continue other meds. Labs today     Will see if aimovig covered. never got covered  optho -  2019 cataract sx dr lopez -- will make appt.      [facility]  covid vaccines - 2021 and booster.   pcv and pneumovax - 2013 and 2016 no longer needs.     saw cards at [facility] 2018/2019   Cards dr holden - has monitor implant 8/2022 -   Needs tdap. Sent in     Health Maintenance Due   Topic Date Due    Hepatitis C Screening  Never done    TETANUS VACCINE  Never done    COVID-19 Vaccine (5 - Booster for Moderna series) 05/30/2022    Influenza Vaccine (1) 09/01/2022       Problem List Items Addressed This Visit          Cardiac/Vascular    Hypertension - Primary    Hyperlipidemia       ID    Screening for viral disease       Endocrine    Hypothyroidism       Other    Tobacco use     Other Visit Diagnoses       Long term current use of therapeutic drug        Hyperglycemia                 Health Maintenance Topics with due status: Not Due       Topic Last Completion Date    Colorectal Cancer Screening 10/24/2016    Lipid Panel 11/08/2021    Mammogram 02/01/2022    DEXA Scan 06/09/2022       Future Appointments   Date Time Provider Department Center   11/16/2022  9:30 AM Janet Bermudez DNP [facility] [place]   2/7/2023 10:45 AM [facility]AM[facility]1 [facility] [place]   5/11/2023  9:00 AM AWV NURSE, [facility] FAMILY MEDICINE [facility] [place]            Signature:  Janet Bermudez DNP      [address] [place] [address]    Date of encounter: 11/16/22

## 2022-11-17 ENCOUNTER — TELEPHONE (OUTPATIENT)
Dept: FAMILY MEDICINE | Facility: CLINIC | Age: 72
End: 2022-11-17
Payer: MEDICARE

## 2022-11-17 NOTE — TELEPHONE ENCOUNTER
----- Message from Janet Bermudez DNP sent at 11/16/2022  8:12 PM CST -----  Thyroid level slightly elevated compared to her previous test .. make sure shes taking first thing in morning on empty stomach verify that shes had no swallowing issues or food feeling stuck  - b12 is low as well she can take supplement or womens [facility]VI

## 2022-11-17 NOTE — TELEPHONE ENCOUNTER
Call and notified pt of labs and recommendations    Patient is not having any swallowing issues   Taking Thyroid Med correctly on a empty stomach and 30mins before eating

## 2022-11-30 ENCOUNTER — EXTERNAL CHRONIC CARE MANAGEMENT (OUTPATIENT)
Dept: FAMILY MEDICINE | Facility: CLINIC | Age: 72
End: 2022-11-30
Payer: MEDICARE

## 2022-11-30 PROCEDURE — G0511 CCM/BHI BY RHC/FQHC 20MIN MO: HCPCS | Mod: ,,, | Performed by: NURSE PRACTITIONER

## 2022-11-30 PROCEDURE — G0511 PR CHRONIC CARE MGMT, RHC OR FQHC ONLY, 20 MINS OR MORE: ICD-10-PCS | Mod: ,,, | Performed by: NURSE PRACTITIONER

## 2022-12-31 ENCOUNTER — EXTERNAL CHRONIC CARE MANAGEMENT (OUTPATIENT)
Dept: FAMILY MEDICINE | Facility: CLINIC | Age: 72
End: 2022-12-31
Payer: MEDICARE

## 2022-12-31 PROCEDURE — G0511 PR CHRONIC CARE MGMT, RHC OR FQHC ONLY, 20 MINS OR MORE: ICD-10-PCS | Mod: ,,, | Performed by: NURSE PRACTITIONER

## 2022-12-31 PROCEDURE — G0511 CCM/BHI BY RHC/FQHC 20MIN MO: HCPCS | Mod: ,,, | Performed by: NURSE PRACTITIONER

## 2023-01-31 ENCOUNTER — EXTERNAL CHRONIC CARE MANAGEMENT (OUTPATIENT)
Dept: FAMILY MEDICINE | Facility: CLINIC | Age: 73
End: 2023-01-31
Payer: MEDICARE

## 2023-01-31 PROCEDURE — G0511 CCM/BHI BY RHC/FQHC 20MIN MO: HCPCS | Mod: ,,, | Performed by: NURSE PRACTITIONER

## 2023-01-31 PROCEDURE — G0511 PR CHRONIC CARE MGMT, RHC OR FQHC ONLY, 20 MINS OR MORE: ICD-10-PCS | Mod: ,,, | Performed by: NURSE PRACTITIONER

## 2023-02-07 ENCOUNTER — HOSPITAL ENCOUNTER (OUTPATIENT)
Dept: RADIOLOGY | Facility: HOSPITAL | Age: 73
Discharge: HOME | End: 2023-02-07
Payer: MEDICARE

## 2023-02-07 VITALS — WEIGHT: 150 LBS | HEIGHT: 67 IN | BODY MASS INDEX: 23.54 KG/M2

## 2023-02-07 DIAGNOSIS — Z12.31 OTHER SCREENING MAMMOGRAM: ICD-10-CM

## 2023-02-07 PROCEDURE — 77067 SCR MAMMO BI INCL CAD: CPT | Mod: TC

## 2023-02-28 ENCOUNTER — EXTERNAL CHRONIC CARE MANAGEMENT (OUTPATIENT)
Dept: FAMILY MEDICINE | Facility: CLINIC | Age: 73
End: 2023-02-28
Payer: MEDICARE

## 2023-02-28 PROCEDURE — G0511 CCM/BHI BY RHC/FQHC 20MIN MO: HCPCS | Mod: ,,, | Performed by: NURSE PRACTITIONER

## 2023-02-28 PROCEDURE — G0511 PR CHRONIC CARE MGMT, RHC OR FQHC ONLY, 20 MINS OR MORE: ICD-10-PCS | Mod: ,,, | Performed by: NURSE PRACTITIONER

## 2023-03-31 ENCOUNTER — EXTERNAL CHRONIC CARE MANAGEMENT (OUTPATIENT)
Dept: FAMILY MEDICINE | Facility: CLINIC | Age: 73
End: 2023-03-31
Payer: MEDICARE

## 2023-03-31 PROCEDURE — G0511 PR CHRONIC CARE MGMT, RHC OR FQHC ONLY, 20 MINS OR MORE: ICD-10-PCS | Mod: ,,, | Performed by: NURSE PRACTITIONER

## 2023-03-31 PROCEDURE — G0511 CCM/BHI BY RHC/FQHC 20MIN MO: HCPCS | Mod: ,,, | Performed by: NURSE PRACTITIONER

## 2023-04-30 ENCOUNTER — EXTERNAL CHRONIC CARE MANAGEMENT (OUTPATIENT)
Dept: FAMILY MEDICINE | Facility: CLINIC | Age: 73
End: 2023-04-30
Payer: MEDICARE

## 2023-04-30 PROCEDURE — G0511 PR CHRONIC CARE MGMT, RHC OR FQHC ONLY, 20 MINS OR MORE: ICD-10-PCS | Mod: ,,, | Performed by: NURSE PRACTITIONER

## 2023-04-30 PROCEDURE — G0511 CCM/BHI BY RHC/FQHC 20MIN MO: HCPCS | Mod: ,,, | Performed by: NURSE PRACTITIONER

## 2023-05-11 ENCOUNTER — OFFICE VISIT (OUTPATIENT)
Dept: FAMILY MEDICINE | Facility: CLINIC | Age: 73
End: 2023-05-11
Payer: MEDICARE

## 2023-05-11 VITALS
DIASTOLIC BLOOD PRESSURE: 68 MMHG | OXYGEN SATURATION: 97 % | BODY MASS INDEX: 24.33 KG/M2 | SYSTOLIC BLOOD PRESSURE: 122 MMHG | HEIGHT: 67 IN | HEART RATE: 62 BPM | WEIGHT: 155 LBS | RESPIRATION RATE: 20 BRPM

## 2023-05-11 DIAGNOSIS — Z90.710 H/O TOTAL HYSTERECTOMY: ICD-10-CM

## 2023-05-11 DIAGNOSIS — R42 DIZZINESS AND GIDDINESS: ICD-10-CM

## 2023-05-11 DIAGNOSIS — I10 HYPERTENSION, UNSPECIFIED TYPE: ICD-10-CM

## 2023-05-11 DIAGNOSIS — I47.10 SUPRAVENTRICULAR TACHYCARDIA: ICD-10-CM

## 2023-05-11 DIAGNOSIS — R42 DIZZINESS: ICD-10-CM

## 2023-05-11 DIAGNOSIS — Z87.891 PERSONAL HISTORY OF NICOTINE DEPENDENCE: ICD-10-CM

## 2023-05-11 DIAGNOSIS — E03.9 HYPOTHYROIDISM, UNSPECIFIED TYPE: ICD-10-CM

## 2023-05-11 DIAGNOSIS — E78.5 HYPERLIPIDEMIA, UNSPECIFIED HYPERLIPIDEMIA TYPE: ICD-10-CM

## 2023-05-11 DIAGNOSIS — Z00.00 ENCOUNTER FOR SUBSEQUENT ANNUAL WELLNESS VISIT (AWV) IN MEDICARE PATIENT: Primary | ICD-10-CM

## 2023-05-11 DIAGNOSIS — J44.9 CHRONIC OBSTRUCTIVE PULMONARY DISEASE, UNSPECIFIED COPD TYPE: ICD-10-CM

## 2023-05-11 PROCEDURE — G0439 PR MEDICARE ANNUAL WELLNESS SUBSEQUENT VISIT: ICD-10-PCS | Mod: ,,, | Performed by: NURSE PRACTITIONER

## 2023-05-11 PROCEDURE — G0439 PPPS, SUBSEQ VISIT: HCPCS | Mod: ,,, | Performed by: NURSE PRACTITIONER

## 2023-05-11 RX ORDER — MECLIZINE HYDROCHLORIDE 25 MG/1
25 TABLET ORAL 3 TIMES DAILY PRN
Qty: 30 TABLET | Refills: 0 | Status: SHIPPED | OUTPATIENT
Start: 2023-05-11

## 2023-05-11 NOTE — PROGRESS NOTES
[place] [facility]       PATIENT NAME: Norma Lopez   : 1950    AGE: 73 y.o. DATE: 2023   MRN: [id number]        Reason for Visit / Chief Complaint: Medicare AWV Follow Up (MEDICARE WELLNESS SUBSEQUENT VISIT)        Norma Lopez presents for an Subsequent Medicare AWV today.     The following components were reviewed and updated:    Medical/Social/Family History:  Past Medical History:   Diagnosis Date    Hypertension     Migraines         Family History   Problem Relation Age of Onset    Diabetes Mother     Stroke Father     Cancer Brother         Social History     Tobacco Use   Smoking Status Former    Packs/day: 0.75    Years: 49.00    Pack years: 36.75    Types: Cigarettes   Smokeless Tobacco Never   Tobacco Comments    Quit 10 years      Social History     Substance and Sexual Activity   Alcohol Use Not Currently    Comment: Quit 30 years ago       Family History   Problem Relation Age of Onset    Diabetes Mother     Stroke Father     Cancer Brother        Past Surgical History:   Procedure Laterality Date    HYSTERECTOMY      Total    OOPHORECTOMY           Allergies and Current Medications   Review of patient's allergies indicates:  No Known Allergies    Current Outpatient Medications:     atorvastatin (LIPITOR) 10 MG tablet, Take 1 tablet (10 mg total) by mouth once daily., Disp: 90 tablet, Rfl: 3    ergocalciferol (ERGOCALCIFEROL) 50,000 unit Cap, Take 1 capsule (50,000 Units total) by mouth every 7 days., Disp: 12 capsule, Rfl: 3    levothyroxine (SYNTHROID) 100 MCG tablet, Take 1 tablet by mouth once daily, Disp: 90 tablet, Rfl: 0    propranoloL (INDERAL LA) 120 MG 24 hr capsule, Take 1 capsule by mouth once daily, Disp: 90 capsule, Rfl: 0    sumatriptan (IMITREX STATDOSE) 6 mg/0.5 mL kit, INJECT 0.5ML SUBCUTANEOUSLY AT ONSET OF HEADACHE, MAY REPEAT ONCE AFTER AT LEAST 1 HOUR, MAXIMUM 2 INJECTIONS DAILY, Disp:  12 mL, Rfl: 0    sumatriptan (IMITREX) 100 MG tablet, No more than 6 per day for migraines., Disp: 18 tablet, Rfl: 11    Health Risk Assessment   Fall Risk: No   Obesity: BMI 24.28     Advance Directive:  X  Patient has advanced directives written and agrees to provide copies to the institution.    Depression: PHQ9 -0    HTN: 122/68    T2DM: A1C- 5.3    Tobacco use: Denies tobacco use  STI: Not at risk    Alcohol misuse: Denies alcohol use Cage Score: N/A   Statin Use: Continue statin use. Encouraged heart healthy diet & exercise   Mini Co/5      Health Risk Assessment  What is your age?: 70-79  Are you male or female?: Female  During the past four weeks, how much have you been bothered by emotional problems such as feeling anxious, depressed, irritable, sad, or downhearted and blue?: Not at all  During the past five weeks, has your physical and/or emotional health limited your social activities with family, friends, neighbors, or groups?: Not at all  During the past four weeks, how much bodily pain have you generally had?: No pain  During the past four weeks, was someone available to help if you needed and wanted help?: Yes, as much as I wanted  During the past four weeks, what was the hardest physical activity you could do for at least two minutes?: Heavy  Can you get to places out of walking distance without help?  (For example, can you travel alone on buses or taxis, or drive your own car?): Yes  Can you go shopping for groceries or clothes without someone's help?: Yes  Can you prepare your own meals?: Yes  Can you do your own housework without help?: Yes  Because of any health problems, do you need the help of another person with your personal care needs such as eating, bathing, dressing, or getting around the house?: No  Can you handle your own money without help?: Yes  During the past four weeks, how would you rate your health in general?: Very good  How have things been going for you during the past  four weeks?: Pretty well  Are you having difficulties driving your car?: No  Do you always fasten your seat belt when you are in a car?: Yes, usually  How often in the past four weeks have you been bothered by falling or dizzy when standing up?: Sometimes  How often in the past four weeks have you been bothered by sexual problems?: Never  How often in the past four weeks have you been bothered by trouble eating well?: Never  How often in the past four weeks have you been bothered by teeth or denture problems?: Never  How often in the past four weeks have you been bothered with problems using the telephone?: Never  How often in the past four weeks have you been bothered by tiredness or fatigue?: Never  Have you fallen two or more times in the past year?: No  Are you afraid of falling?: No  Are you a smoker?: No  During the past four weeks, how many drinks of wine, beer, or other alcoholic beverages did you have?: No alcohol at all  Do you exercise for about 20 minutes three or more days a week?: Yes, some of the time  Have you been given any information to help you with hazards in your house that might hurt you?: Yes  Have you been given any information to help you with keeping track of your medications?: Yes  How often do you have trouble taking medicines the way you've been told to take them?: I always take them as prescribed  How confident are you that you can control and manage most of your health problems?: Very confident  What is your race? (Check all that apply.):     Opioid Risk Assessment:  Opioid risk assessment performed today. Patient is LOW risk for opoid abuse.     Opioid Risk Score         Value Time User    Opioid Risk Score  0 5/11/2023  9:19 AM Inna Cochran, ZINA                 Health Maintenance   Last eye exam: > 1 year with Dr. Almaguer   Last CV screen with lipids: 11/16/2022   Diabetes screening with fasting glucose or A1c: 11/16/2022   Colonoscopy: 10/24/2016- Repeat in 10  years   Flu Vaccine: 09/06/2022   Pneumonia vaccines: 08/27/2020; 11/07/2023   COVID vaccine: 02/26/2021; 03/26/2021; 11/01/2021; 04/04/2022; 10/07/2022   Hep B vaccine: Not at risk   DEXA: 06/09/2022- Normal   Last pap/pelvic: Total hysterectomy   Last Mammogram: 02/07/2023   Last PSA screen: N/A   AAA screening: N/A (once in lifetime for males 65-75 who have smoked > 100 cigarettes in lifetime)  HIV Screeing: N/A  Hepatitis C Screen: 11/16/2022- Nonreactive  Low Dose CT Scan: Eligible. Quit smoking 10 years ago. Smoked approx 0.75 ppd x 49 years. Patient is a past smoker, has a 36.75 pack year history, has no s/s of lung cancer, and is currently 73 years old.    Counseling and Shared Decision Making requirements:   * I have discussed the information and determined that the patient meets all of the criteria   *I have counseled the patient on the importance of smoking cessation   *I have provided to the patient smoking cessation counseling / abstinence counseling and provided patient with ways on how to quit   *Previous smoking cessation interventions include N/A .   *I have discussed with the patient the importance of annual screening if the patient continues to meet the above criteria.   * The patient has the following comorbid conditions that I am aware of that may affect screening, further testing, or treatment: hypertension     Health Maintenance Topics with due status: Not Due       Topic Last Completion Date    Colorectal Cancer Screening 10/24/2016    DEXA Scan 06/09/2022    Lipid Panel 11/16/2022     Health Maintenance Due   Topic Date Due    TETANUS VACCINE  Never done    Shingles Vaccine (1 of 2) Never done       Incontinence  Bowel: No  Bladder: No    Lab results available in Epic or see dates from [facility] above:   Lab Results   Component Value Date    CHOL 171 11/16/2022    CHOL 161 11/08/2021    CHOL 171 04/21/2021     Lab Results   Component Value Date    HDL 92 (H) 11/16/2022    HDL 97 (H) 11/08/2021     HDL 97 (H) 04/21/2021     Lab Results   Component Value Date    LDLCALC 62 11/16/2022    LDLCALC 48 11/08/2021    LDLCALC 59 04/21/2021     Lab Results   Component Value Date    TRIG 87 11/16/2022    TRIG 78 11/08/2021    TRIG 77 04/21/2021     Lab Results   Component Value Date    CHOLHDL 1.9 11/16/2022    CHOLHDL 1.7 11/08/2021    CHOLHDL 1.8 04/21/2021       Lab Results   Component Value Date    HGBA1C 5.3 11/16/2022       Sodium   Date Value Ref Range Status   11/16/2022 138 136 - 145 mmol/L Final     Potassium   Date Value Ref Range Status   11/16/2022 4.3 3.5 - 5.1 mmol/L Final     Chloride   Date Value Ref Range Status   11/16/2022 103 98 - 107 mmol/L Final     CO2   Date Value Ref Range Status   11/16/2022 32 21 - 32 mmol/L Final     Glucose   Date Value Ref Range Status   11/16/2022 88 74 - 106 mg/dL Final     BUN   Date Value Ref Range Status   11/16/2022 13 7 - 18 mg/dL Final     Creatinine   Date Value Ref Range Status   11/16/2022 0.72 0.55 - 1.02 mg/dL Final     Calcium   Date Value Ref Range Status   11/16/2022 9.6 8.5 - 10.1 mg/dL Final     Total Protein   Date Value Ref Range Status   11/16/2022 7.1 6.4 - 8.2 g/dL Final     Albumin   Date Value Ref Range Status   11/16/2022 4.0 3.5 - 5.0 g/dL Final     Bilirubin, Total   Date Value Ref Range Status   11/16/2022 0.3 >0.0 - 1.2 mg/dL Final     Alk Phos   Date Value Ref Range Status   11/16/2022 50 (L) 55 - 142 U/L Final     AST   Date Value Ref Range Status   11/16/2022 14 (L) 15 - 37 U/L Final     ALT   Date Value Ref Range Status   11/16/2022 18 13 - 56 U/L Final     Anion Gap   Date Value Ref Range Status   11/16/2022 7 7 - 16 mmol/L Final     eGFR   Date Value Ref Range Status   11/08/2021 77 >=60 mL/min/1.73m² Final         No results found for: PSA (Delete this line if female pt)        Care Team   PCP: CONSTANCE Patel    Eye specialist: Tripp   Cardiologist: Rolan         **See Completed Assessments for Annual Wellness visit within  "the encounter summary    The following assessments were completed & reviewed:  Depression Screening  Cognitive function Screening  Timed Get Up Test  Whisper Test  Vision Screen  Health Risk Assessment  Checklist of ADLs and IADLs      Objective  Vitals:    05/11/23 0911   BP: 122/68   Pulse: 62   Resp: 20   SpO2: 97%   Weight: 70.3 kg (155 lb)   Height: 5' 7" (1.702 m)   PainSc: 0-No pain      Body mass index is 24.28 kg/m².  Ideal body weight: 61.6 kg (135 lb 12.9 oz)       Physical Exam  Vitals and nursing note reviewed.   Constitutional:       Appearance: Normal appearance. She is normal weight.   HENT:      Head: Normocephalic.      Nose: Nose normal.      Mouth/Throat:      Mouth: Mucous membranes are moist.   Eyes:      Pupils: Pupils are equal, round, and reactive to light.   Cardiovascular:      Rate and Rhythm: Normal rate and regular rhythm.      Pulses: Normal pulses.      Heart sounds: Normal heart sounds.   Pulmonary:      Effort: Pulmonary effort is normal.      Breath sounds: Normal breath sounds.   Abdominal:      General: Abdomen is flat. Bowel sounds are normal.      Palpations: Abdomen is soft.   Musculoskeletal:         General: Normal range of motion.      Cervical back: Normal range of motion and neck supple.   Skin:     General: Skin is warm and dry.      Capillary Refill: Capillary refill takes less than 2 seconds.   Neurological:      General: No focal deficit present.      Mental Status: She is alert and oriented to person, place, and time. Mental status is at baseline.   Psychiatric:         Mood and Affect: Mood normal.         Behavior: Behavior normal.         Thought Content: Thought content normal.         Judgment: Judgment normal.       Assessment:     1. Encounter for subsequent annual wellness visit (AWV) in Medicare patient    2. Hypertension, unspecified type    3. Hypothyroidism, unspecified type    4. Hyperlipidemia, unspecified hyperlipidemia type    5. H/O total " hysterectomy    6. BMI 24.0-24.9, adult    7. Personal history of nicotine dependence          Plan:    Referrals/Orders:  LDCT     Advised to call office if does not hear from anyone with referral appt within 2-3 weeks to check on status of referral. Voiced understanding.    Keep current on appts & continue medications as ordered. Prevent falls by wearing good non-skid shoes.      Pt reports persistent dizziness over the last few days with N/V & associated worsening headache.  Will schedule for CT/ENT referral.  Adding Antivert to see if it will improve her symptoms.       Discussed and provided with a screening schedule and personal prevention plan in accordance with USPSTF age appropriate recommendations and Medicare screening guidelines.   Education, counseling, and referrals were provided as needed.  After Visit Summary printed and given to patient which includes written education and a list of any referrals if indicated. All education discussed with patient & handouts given to patient.      F/u plan for yearly AWV.

## 2023-05-11 NOTE — PATIENT INSTRUCTIONS
Counseling and Referral of Other Preventative  (Italic type indicates deductible and co-insurance are waived)    Patient Name: Norma Lopez  Today's Date: 5/11/2023    Health Maintenance       Date Due Completion Date    TETANUS VACCINE Never done ---    Shingles Vaccine (1 of 2) Never done ---    Colorectal Cancer Screening 10/24/2026 10/24/2016    DEXA Scan 06/09/2027 6/9/2022    Lipid Panel 11/16/2027 11/16/2022        No orders of the defined types were placed in this encounter.    The following information is provided to all patients.  This information is to help you find resources for any of the problems found today that may be affecting your health:                Living healthy guide: www.[facility].[place].gov      Understanding Diabetes: www.diabetes.org      Eating healthy: www.cdc.gov/healthyweight      [facility] home safety checklist: www.cdc.gov/steadi/patient.html      Agency on Aging: www.goea.[place].[facility]      Alcoholics anonymous (AA): www.aa.org      Physical Activity: www.jennie.nih.gov/gp0irrh      Tobacco use: www.quitwithusla.org

## 2023-05-12 ENCOUNTER — HOSPITAL ENCOUNTER (OUTPATIENT)
Dept: RADIOLOGY | Facility: HOSPITAL | Age: 73
Discharge: HOME | End: 2023-05-12
Attending: NURSE PRACTITIONER
Payer: MEDICARE

## 2023-05-12 DIAGNOSIS — R42 DIZZINESS AND GIDDINESS: ICD-10-CM

## 2023-05-12 LAB — CREAT SERPL-MCNC: 0.8 MG/DL (ref 0.6–1.3)

## 2023-05-12 PROCEDURE — 70470 CT HEAD WITH AND WITHOUT: ICD-10-PCS | Mod: 26,,, | Performed by: RADIOLOGY

## 2023-05-12 PROCEDURE — 70470 CT HEAD/BRAIN W/O & W/DYE: CPT | Mod: TC

## 2023-05-12 PROCEDURE — 70470 CT HEAD/BRAIN W/O & W/DYE: CPT | Mod: 26,,, | Performed by: RADIOLOGY

## 2023-05-12 PROCEDURE — 25500020 PHARM REV CODE 255: Performed by: NURSE PRACTITIONER

## 2023-05-12 PROCEDURE — 82565 ASSAY OF CREATININE: CPT

## 2023-05-12 RX ADMIN — IOPAMIDOL 100 ML: 755 INJECTION, SOLUTION INTRAVENOUS at 10:05

## 2023-05-15 ENCOUNTER — OFFICE VISIT (OUTPATIENT)
Dept: OTOLARYNGOLOGY | Facility: CLINIC | Age: 73
End: 2023-05-15
Payer: MEDICARE

## 2023-05-15 VITALS — HEIGHT: 67 IN | BODY MASS INDEX: 24.33 KG/M2 | WEIGHT: 155 LBS

## 2023-05-15 DIAGNOSIS — Z87.891 PERSONAL HISTORY OF NICOTINE DEPENDENCE: Primary | ICD-10-CM

## 2023-05-15 DIAGNOSIS — R42 DIZZINESS AND GIDDINESS: ICD-10-CM

## 2023-05-15 DIAGNOSIS — R42 DIZZINESS: ICD-10-CM

## 2023-05-15 PROCEDURE — 99204 PR OFFICE/OUTPT VISIT, NEW, LEVL IV, 45-59 MIN: ICD-10-PCS | Mod: S$PBB,,, | Performed by: OTOLARYNGOLOGY

## 2023-05-15 PROCEDURE — 99213 OFFICE O/P EST LOW 20 MIN: CPT | Mod: PBBFAC | Performed by: OTOLARYNGOLOGY

## 2023-05-15 PROCEDURE — 99204 OFFICE O/P NEW MOD 45 MIN: CPT | Mod: S$PBB,,, | Performed by: OTOLARYNGOLOGY

## 2023-05-15 NOTE — PROGRESS NOTES
Subjective:       Patient ID: Norma Lopez is a 73 y.o. female.    Chief Complaint: Dizziness (Pt states left side of head feels dizzy. )  CT performed a few days ago reviewed by me no acute process  Dizziness:    Associated symptoms: hearing loss and ear pain.  Review of Systems   HENT:  Positive for ear pain and hearing loss.    Neurological:  Positive for dizziness.   All other systems reviewed and are negative.    Objective:      Physical Exam  General: NAD  Head: Normocephalic, atraumatic, no facial asymmetry/normal strength,  Ears: Both auricules normal in appearance, w/o deformities tympanic membranes normal external auditory canals  mild wax right   Nose: External nose w/o deformities normal turbinates no drainage or inflammation  Oral Cavity: Lips, gums, floor of mouth, tongue hard palate, and buccal mucosa without mass/lesion  Oropharynx: Mucosa pink and moist, soft palate, posterior pharynx and oropharyngeal wall without mass/lesion  Neck: Supple, symmetric, trachea midline, no palpable mass/lesion, no palpable cervical lymphadenopathy  Skin: Warm and dry, no concerning lesions  Respiratory: Respirations even, unlabored   Assessment:       1. Dizziness    2. Dizziness and giddiness        Plan:       Discussed dizziness in detail doing some better continue meclizine   May need epley maneuver if not better

## 2023-05-19 ENCOUNTER — TELEPHONE (OUTPATIENT)
Dept: FAMILY MEDICINE | Facility: CLINIC | Age: 73
End: 2023-05-19
Payer: MEDICARE

## 2023-05-19 DIAGNOSIS — R42 VERTIGO: ICD-10-CM

## 2023-05-19 DIAGNOSIS — R42 DIZZINESS: Primary | ICD-10-CM

## 2023-05-19 DIAGNOSIS — H83.8X9 OTHER SPECIFIED DISEASES OF INNER EAR, UNSPECIFIED EAR: ICD-10-CM

## 2023-05-19 NOTE — TELEPHONE ENCOUNTER
----- Message from Janet Bermudez, DNP sent at 5/19/2023 12:16 PM CDT -----  Yes - we have internal thanks

## 2023-05-31 ENCOUNTER — EXTERNAL CHRONIC CARE MANAGEMENT (OUTPATIENT)
Dept: FAMILY MEDICINE | Facility: CLINIC | Age: 73
End: 2023-05-31
Payer: MEDICARE

## 2023-05-31 PROCEDURE — G0511 CCM/BHI BY RHC/FQHC 20MIN MO: HCPCS | Mod: ,,, | Performed by: NURSE PRACTITIONER

## 2023-05-31 PROCEDURE — G0511 PR CHRONIC CARE MGMT, RHC OR FQHC ONLY, 20 MINS OR MORE: ICD-10-PCS | Mod: ,,, | Performed by: NURSE PRACTITIONER

## 2023-06-28 ENCOUNTER — HOSPITAL ENCOUNTER (OUTPATIENT)
Dept: RADIOLOGY | Facility: HOSPITAL | Age: 73
Discharge: HOME | End: 2023-06-28
Attending: NURSE PRACTITIONER
Payer: MEDICARE

## 2023-06-28 DIAGNOSIS — Z87.891 PERSONAL HISTORY OF NICOTINE DEPENDENCE: ICD-10-CM

## 2023-06-28 PROCEDURE — 71271 CT CHEST LUNG SCREENING LOW DOSE: ICD-10-PCS | Mod: 26,,, | Performed by: RADIOLOGY

## 2023-06-28 PROCEDURE — 71271 CT THORAX LUNG CANCER SCR C-: CPT | Mod: TC

## 2023-06-28 PROCEDURE — 71271 CT THORAX LUNG CANCER SCR C-: CPT | Mod: 26,,, | Performed by: RADIOLOGY

## 2023-06-30 ENCOUNTER — EXTERNAL CHRONIC CARE MANAGEMENT (OUTPATIENT)
Dept: FAMILY MEDICINE | Facility: CLINIC | Age: 73
End: 2023-06-30
Payer: MEDICARE

## 2023-06-30 PROCEDURE — G0511 PR CHRONIC CARE MGMT, RHC OR FQHC ONLY, 20 MINS OR MORE: ICD-10-PCS | Mod: ,,, | Performed by: NURSE PRACTITIONER

## 2023-06-30 PROCEDURE — G0511 CCM/BHI BY RHC/FQHC 20MIN MO: HCPCS | Mod: ,,, | Performed by: NURSE PRACTITIONER

## 2023-07-10 ENCOUNTER — DOCUMENTATION ONLY (OUTPATIENT)
Dept: RADIOLOGY | Facility: HOSPITAL | Age: 73
End: 2023-07-10
Payer: MEDICARE

## 2023-07-31 ENCOUNTER — EXTERNAL CHRONIC CARE MANAGEMENT (OUTPATIENT)
Dept: FAMILY MEDICINE | Facility: CLINIC | Age: 73
End: 2023-07-31
Payer: MEDICARE

## 2023-07-31 PROCEDURE — G0511 PR CHRONIC CARE MGMT, RHC OR FQHC ONLY, 20 MINS OR MORE: ICD-10-PCS | Mod: ,,, | Performed by: NURSE PRACTITIONER

## 2023-07-31 PROCEDURE — G0511 CCM/BHI BY RHC/FQHC 20MIN MO: HCPCS | Mod: ,,, | Performed by: NURSE PRACTITIONER

## 2023-08-14 PROBLEM — Z00.00 ENCOUNTER FOR SUBSEQUENT ANNUAL WELLNESS VISIT (AWV) IN MEDICARE PATIENT: Status: RESOLVED | Noted: 2021-11-08 | Resolved: 2023-08-14

## 2023-08-31 ENCOUNTER — EXTERNAL CHRONIC CARE MANAGEMENT (OUTPATIENT)
Dept: FAMILY MEDICINE | Facility: CLINIC | Age: 73
End: 2023-08-31
Payer: MEDICARE

## 2023-08-31 PROCEDURE — G0511 PR CHRONIC CARE MGMT, RHC OR FQHC ONLY, 20 MINS OR MORE: ICD-10-PCS | Mod: ,,, | Performed by: NURSE PRACTITIONER

## 2023-08-31 PROCEDURE — G0511 CCM/BHI BY RHC/FQHC 20MIN MO: HCPCS | Mod: ,,, | Performed by: NURSE PRACTITIONER

## 2023-09-30 ENCOUNTER — EXTERNAL CHRONIC CARE MANAGEMENT (OUTPATIENT)
Dept: FAMILY MEDICINE | Facility: CLINIC | Age: 73
End: 2023-09-30
Payer: MEDICARE

## 2023-09-30 PROCEDURE — G0511 CCM/BHI BY RHC/FQHC 20MIN MO: HCPCS | Mod: ,,, | Performed by: NURSE PRACTITIONER

## 2023-09-30 PROCEDURE — G0511 PR CHRONIC CARE MGMT, RHC OR FQHC ONLY, 20 MINS OR MORE: ICD-10-PCS | Mod: ,,, | Performed by: NURSE PRACTITIONER

## 2023-10-31 ENCOUNTER — EXTERNAL CHRONIC CARE MANAGEMENT (OUTPATIENT)
Dept: FAMILY MEDICINE | Facility: CLINIC | Age: 73
End: 2023-10-31
Payer: MEDICARE

## 2023-10-31 PROCEDURE — G0511 CCM/BHI BY RHC/FQHC 20MIN MO: HCPCS | Mod: ,,, | Performed by: NURSE PRACTITIONER

## 2023-10-31 PROCEDURE — G0511 PR CHRONIC CARE MGMT, RHC OR FQHC ONLY, 20 MINS OR MORE: ICD-10-PCS | Mod: ,,, | Performed by: NURSE PRACTITIONER

## 2023-11-27 PROCEDURE — G0511 PR CHRONIC CARE MGMT, RHC OR FQHC ONLY, 20 MINS OR MORE: ICD-10-PCS | Mod: ,,, | Performed by: NURSE PRACTITIONER

## 2023-11-27 PROCEDURE — G0511 CCM/BHI BY RHC/FQHC 20MIN MO: HCPCS | Mod: ,,, | Performed by: NURSE PRACTITIONER

## 2023-11-30 ENCOUNTER — EXTERNAL CHRONIC CARE MANAGEMENT (OUTPATIENT)
Dept: FAMILY MEDICINE | Facility: CLINIC | Age: 73
End: 2023-11-30
Payer: MEDICARE

## 2023-11-30 ENCOUNTER — OFFICE VISIT (OUTPATIENT)
Dept: FAMILY MEDICINE | Facility: CLINIC | Age: 73
End: 2023-11-30
Payer: MEDICARE

## 2023-11-30 VITALS
OXYGEN SATURATION: 97 % | HEART RATE: 67 BPM | SYSTOLIC BLOOD PRESSURE: 159 MMHG | DIASTOLIC BLOOD PRESSURE: 78 MMHG | BODY MASS INDEX: 24.28 KG/M2 | HEIGHT: 68 IN | WEIGHT: 160.19 LBS | TEMPERATURE: 98 F

## 2023-11-30 DIAGNOSIS — R06.2 WHEEZING: ICD-10-CM

## 2023-11-30 DIAGNOSIS — J02.9 SORE THROAT: ICD-10-CM

## 2023-11-30 DIAGNOSIS — J40 BRONCHITIS: Primary | ICD-10-CM

## 2023-11-30 LAB
CTP QC/QA: YES
FLUAV AG NPH QL: NEGATIVE
FLUBV AG NPH QL: NEGATIVE
S PYO RRNA THROAT QL PROBE: NEGATIVE
SARS-COV-2 AG RESP QL IA.RAPID: NEGATIVE

## 2023-11-30 PROCEDURE — 96372 PR INJECTION,THERAP/PROPH/DIAG2ST, IM OR SUBCUT: ICD-10-PCS | Mod: ,,, | Performed by: NURSE PRACTITIONER

## 2023-11-30 PROCEDURE — 87880 STREP A ASSAY W/OPTIC: CPT | Mod: RHCUB | Performed by: NURSE PRACTITIONER

## 2023-11-30 PROCEDURE — 94640 PR INHAL RX, AIRWAY OBST/DX SPUTUM INDUCT: ICD-10-PCS | Mod: ,,, | Performed by: NURSE PRACTITIONER

## 2023-11-30 PROCEDURE — 87426 SARSCOV CORONAVIRUS AG IA: CPT | Mod: RHCUB | Performed by: NURSE PRACTITIONER

## 2023-11-30 PROCEDURE — 99213 OFFICE O/P EST LOW 20 MIN: CPT | Mod: ,,, | Performed by: NURSE PRACTITIONER

## 2023-11-30 PROCEDURE — 99213 PR OFFICE/OUTPT VISIT, EST, LEVL III, 20-29 MIN: ICD-10-PCS | Mod: ,,, | Performed by: NURSE PRACTITIONER

## 2023-11-30 PROCEDURE — 87804 INFLUENZA ASSAY W/OPTIC: CPT | Mod: RHCUB | Performed by: NURSE PRACTITIONER

## 2023-11-30 PROCEDURE — 96372 THER/PROPH/DIAG INJ SC/IM: CPT | Mod: ,,, | Performed by: NURSE PRACTITIONER

## 2023-11-30 PROCEDURE — 94640 AIRWAY INHALATION TREATMENT: CPT | Mod: ,,, | Performed by: NURSE PRACTITIONER

## 2023-11-30 RX ORDER — DOXYCYCLINE 100 MG/1
100 CAPSULE ORAL 2 TIMES DAILY
Qty: 14 CAPSULE | Refills: 0 | Status: SHIPPED | OUTPATIENT
Start: 2023-11-30 | End: 2023-12-07

## 2023-11-30 RX ORDER — RESPIRATORY SYNCYTIAL VISUS VACCINE RECOMBINANT, ADJUVANTED 120MCG/0.5
KIT INTRAMUSCULAR
COMMUNITY
Start: 2023-09-18

## 2023-11-30 RX ORDER — METHYLPREDNISOLONE ACETATE 40 MG/ML
40 INJECTION, SUSPENSION INTRA-ARTICULAR; INTRALESIONAL; INTRAMUSCULAR; SOFT TISSUE
Status: COMPLETED | OUTPATIENT
Start: 2023-11-30 | End: 2023-11-30

## 2023-11-30 RX ORDER — BENZONATATE 100 MG/1
100 CAPSULE ORAL 3 TIMES DAILY PRN
Qty: 60 CAPSULE | Refills: 0 | Status: SHIPPED | OUTPATIENT
Start: 2023-11-30 | End: 2023-12-10

## 2023-11-30 RX ORDER — FLUTICASONE PROPIONATE 50 MCG
1 SPRAY, SUSPENSION (ML) NASAL DAILY
Qty: 11.1 ML | Refills: 0 | Status: SHIPPED | OUTPATIENT
Start: 2023-11-30

## 2023-11-30 RX ORDER — ALBUTEROL SULFATE 0.83 MG/ML
2.5 SOLUTION RESPIRATORY (INHALATION)
Status: COMPLETED | OUTPATIENT
Start: 2023-11-30 | End: 2023-11-30

## 2023-11-30 RX ORDER — CEFTRIAXONE 1 G/1
1 INJECTION, POWDER, FOR SOLUTION INTRAMUSCULAR; INTRAVENOUS
Status: COMPLETED | OUTPATIENT
Start: 2023-11-30 | End: 2023-11-30

## 2023-11-30 RX ORDER — DEXAMETHASONE SODIUM PHOSPHATE 4 MG/ML
4 INJECTION, SOLUTION INTRA-ARTICULAR; INTRALESIONAL; INTRAMUSCULAR; INTRAVENOUS; SOFT TISSUE
Status: COMPLETED | OUTPATIENT
Start: 2023-11-30 | End: 2023-11-30

## 2023-11-30 RX ORDER — METHYLPREDNISOLONE 4 MG/1
TABLET ORAL
Qty: 21 TABLET | Refills: 0 | Status: SHIPPED | OUTPATIENT
Start: 2023-11-30 | End: 2024-01-10

## 2023-11-30 RX ORDER — ZOSTER VACCINE RECOMBINANT, ADJUVANTED 50 MCG/0.5
KIT INTRAMUSCULAR
COMMUNITY
Start: 2023-08-28 | End: 2024-01-10

## 2023-11-30 RX ADMIN — ALBUTEROL SULFATE 2.5 MG: 0.83 SOLUTION RESPIRATORY (INHALATION) at 10:11

## 2023-11-30 RX ADMIN — CEFTRIAXONE 1 G: 1 INJECTION, POWDER, FOR SOLUTION INTRAMUSCULAR; INTRAVENOUS at 10:11

## 2023-11-30 RX ADMIN — METHYLPREDNISOLONE ACETATE 40 MG: 40 INJECTION, SUSPENSION INTRA-ARTICULAR; INTRALESIONAL; INTRAMUSCULAR; SOFT TISSUE at 10:11

## 2023-11-30 RX ADMIN — DEXAMETHASONE SODIUM PHOSPHATE 4 MG: 4 INJECTION, SOLUTION INTRA-ARTICULAR; INTRALESIONAL; INTRAMUSCULAR; INTRAVENOUS; SOFT TISSUE at 10:11

## 2023-11-30 NOTE — PROGRESS NOTES
Janet Bermudez DNP   [address] [place] [address]     PATIENT NAME: Norma Lopez  : 1950  DATE: 23  MRN: [id number]      Billing Provider: Janet Bermudez DNP  Level of Service:   Patient PCP Information       Provider PCP Type    Janet Bermudez DNP General            Reason for Visit / Chief Complaint: Sore Throat, Cough, and Nasal Congestion       Update PCP  Update Chief Complaint         History of Present Illness / Problem Focused Workflow     Norma Lopez presents to the clinic with Sore Throat, Cough, and Nasal Congestion     Sore Throat   Associated symptoms include coughing.   Cough  Associated symptoms include a sore throat.     Review of Systems     Review of Systems   HENT:  Positive for sore throat.    Respiratory:  Positive for cough.       Medical / Social / Family History     Past Medical History:   Diagnosis Date    Hypertension     Migraines        Past Surgical History:   Procedure Laterality Date    HYSTERECTOMY      Total    OOPHORECTOMY         Social History  Ms.  reports that she quit smoking about 10 years ago. Her smoking use included cigarettes. She started smoking about 59 years ago. She has a 49.0 pack-year smoking history. She has never been exposed to tobacco smoke. She has never used smokeless tobacco. She reports that she does not currently use alcohol. She reports that she does not use drugs.    Family History  Ms.'s family history includes Cancer in her brother; Diabetes in her mother; Stroke in her father.    Medications and Allergies     Medications  Outpatient Medications Marked as Taking for the 23 encounter (Office Visit) with Janet Bermudez DNP   Medication Sig Dispense Refill    AREXVY, PF, 120 mcg/0.5 mL SusR vaccine       SHINGRIX, PF, 50 mcg/0.5 mL injection        Current Facility-Administered Medications for the 23 encounter (Office Visit) with Janet Bermudez DNP   Medication Dose Route  "Frequency Provider Last Rate Last Admin    [COMPLETED] albuterol nebulizer solution 2.5 mg  2.5 mg Nebulization 1 time in Clinic/HOD Janet Bermudez DNP   2.5 mg at 11/30/23 1053    [COMPLETED] cefTRIAXone injection 1 g  1 g Intramuscular 1 time in Clinic/HOD Janet Bermudez DNP   1 g at 11/30/23 1040    [COMPLETED] dexAMETHasone injection 4 mg  4 mg Intramuscular 1 time in Clinic/HOD Janet Bermudez DNP   4 mg at 11/30/23 1053    [COMPLETED] methylPREDNISolone acetate injection 40 mg  40 mg Intramuscular 1 time in Clinic/HOD Janet Bermduez DNP   40 mg at 11/30/23 1040       Allergies  Review of patient's allergies indicates:  No Known Allergies    Physical Examination   BP (!) 159/78   Pulse 67   Temp 97.6 °F (36.4 °C)   Ht 5' 8" (1.727 m)   Wt 72.7 kg (160 lb 3.2 oz)   SpO2 97%   BMI 24.36 kg/m²    Physical Exam  Vitals and nursing note reviewed.   Constitutional:       Appearance: Normal appearance. She is normal weight. She is ill-appearing.   HENT:      Head: Normocephalic.      Nose: Congestion and rhinorrhea present.      Mouth/Throat:      Mouth: Mucous membranes are moist.      Pharynx: Posterior oropharyngeal erythema present.   Eyes:      Pupils: Pupils are equal, round, and reactive to light.   Cardiovascular:      Rate and Rhythm: Normal rate and regular rhythm.      Pulses: Normal pulses.      Heart sounds: Normal heart sounds.   Pulmonary:      Effort: Pulmonary effort is normal.      Breath sounds: Wheezing present.   Chest:      Chest wall: Tenderness present.   Abdominal:      General: Abdomen is flat. Bowel sounds are normal.      Palpations: Abdomen is soft.   Musculoskeletal:         General: Normal range of motion.      Cervical back: Normal range of motion and neck supple.   Lymphadenopathy:      Cervical: Cervical adenopathy present.   Skin:     General: Skin is warm and dry.      Capillary Refill: Capillary refill takes less than 2 seconds.   Neurological:      " General: No focal deficit present.      Mental Status: She is alert and oriented to person, place, and time. Mental status is at baseline.   Psychiatric:         Mood and Affect: Mood normal.         Behavior: Behavior normal.         Thought Content: Thought content normal.         Judgment: Judgment normal.        Assessment and Plan (including Health Maintenance)      Problem List  Smart Sets  Document Outside HM   :    Plan:         Health Maintenance Due   Topic Date Due    TETANUS VACCINE  Never done       Problem List Items Addressed This Visit          ENT    Sore throat    Relevant Orders    SARS Coronavirus 2 Antigen, POCT (Completed)    POCT Influenza A/B (Completed)    POCT rapid strep A (Completed)       Pulmonary    Bronchitis - Primary    Wheezing    Relevant Orders    Inhalation Treatment       Endocrine    BMI 24.0-24.9, adult       Health Maintenance Topics with due status: Not Due       Topic Last Completion Date    Colorectal Cancer Screening 10/24/2016    DEXA Scan 06/09/2022    Lipid Panel 11/16/2022    LDCT Lung Screen 06/28/2023       Future Appointments   Date Time Provider Department Center   1/10/2024  8:45 AM Janet Bermudez DNP [facility] [place]   2/13/2024 10:45 AM [facility]1 [facility] [place]   5/15/2024  9:00 AM AWV NURSE, [facility] MATTHIAS [place]            Signature:  Janet Bermudez DNP      [address] [place] [address]    Date of encounter: 11/30/23

## 2023-12-13 RX ORDER — SUMATRIPTAN SUCCINATE 100 MG/1
TABLET ORAL
Qty: 18 TABLET | Refills: 0 | Status: SHIPPED | OUTPATIENT
Start: 2023-12-13 | End: 2024-01-10 | Stop reason: SDUPTHER

## 2023-12-31 ENCOUNTER — EXTERNAL CHRONIC CARE MANAGEMENT (OUTPATIENT)
Dept: FAMILY MEDICINE | Facility: CLINIC | Age: 73
End: 2023-12-31
Payer: MEDICARE

## 2023-12-31 PROCEDURE — G0511 CCM/BHI BY RHC/FQHC 20MIN MO: HCPCS | Mod: ,,, | Performed by: NURSE PRACTITIONER

## 2024-01-10 ENCOUNTER — OFFICE VISIT (OUTPATIENT)
Dept: FAMILY MEDICINE | Facility: CLINIC | Age: 74
End: 2024-01-10
Payer: MEDICARE

## 2024-01-10 VITALS
HEART RATE: 67 BPM | DIASTOLIC BLOOD PRESSURE: 76 MMHG | HEIGHT: 68 IN | BODY MASS INDEX: 23.95 KG/M2 | OXYGEN SATURATION: 99 % | SYSTOLIC BLOOD PRESSURE: 126 MMHG | WEIGHT: 158 LBS

## 2024-01-10 DIAGNOSIS — I49.5 SICK SINUS SYNDROME: ICD-10-CM

## 2024-01-10 DIAGNOSIS — E78.5 HYPERLIPIDEMIA, UNSPECIFIED HYPERLIPIDEMIA TYPE: ICD-10-CM

## 2024-01-10 DIAGNOSIS — E03.9 HYPOTHYROIDISM, UNSPECIFIED TYPE: ICD-10-CM

## 2024-01-10 DIAGNOSIS — R73.9 HYPERGLYCEMIA: ICD-10-CM

## 2024-01-10 DIAGNOSIS — I10 HYPERTENSION, UNSPECIFIED TYPE: Primary | ICD-10-CM

## 2024-01-10 DIAGNOSIS — J44.9 CHRONIC OBSTRUCTIVE PULMONARY DISEASE, UNSPECIFIED COPD TYPE: ICD-10-CM

## 2024-01-10 LAB
ALBUMIN SERPL BCP-MCNC: 4.1 G/DL (ref 3.5–5)
ALBUMIN/GLOB SERPL: 1.4 {RATIO}
ALP SERPL-CCNC: 52 U/L (ref 55–142)
ALT SERPL W P-5'-P-CCNC: 20 U/L (ref 13–56)
ANION GAP SERPL CALCULATED.3IONS-SCNC: 13 MMOL/L (ref 7–16)
AST SERPL W P-5'-P-CCNC: 17 U/L (ref 15–37)
BASOPHILS # BLD AUTO: 0.15 K/UL (ref 0–0.2)
BASOPHILS NFR BLD AUTO: 1.9 % (ref 0–1)
BILIRUB SERPL-MCNC: 0.4 MG/DL (ref ?–1.2)
BUN SERPL-MCNC: 5 MG/DL (ref 7–18)
BUN/CREAT SERPL: 6 (ref 6–20)
CALCIUM SERPL-MCNC: 9.9 MG/DL (ref 8.5–10.1)
CHLORIDE SERPL-SCNC: 103 MMOL/L (ref 98–107)
CHOLEST SERPL-MCNC: 170 MG/DL (ref 0–200)
CHOLEST/HDLC SERPL: 1.8 {RATIO}
CO2 SERPL-SCNC: 27 MMOL/L (ref 21–32)
CREAT SERPL-MCNC: 0.79 MG/DL (ref 0.55–1.02)
DIFFERENTIAL METHOD BLD: ABNORMAL
EGFR (NO RACE VARIABLE) (RUSH/TITUS): 78 ML/MIN/1.73M2
EOSINOPHIL # BLD AUTO: 0.1 K/UL (ref 0–0.5)
EOSINOPHIL NFR BLD AUTO: 1.3 % (ref 1–4)
ERYTHROCYTE [DISTWIDTH] IN BLOOD BY AUTOMATED COUNT: 12.5 % (ref 11.5–14.5)
EST. AVERAGE GLUCOSE BLD GHB EST-MCNC: 114 MG/DL
GLOBULIN SER-MCNC: 2.9 G/DL (ref 2–4)
GLUCOSE SERPL-MCNC: 92 MG/DL (ref 74–106)
HBA1C MFR BLD HPLC: 5.6 % (ref 4.5–6.6)
HCT VFR BLD AUTO: 41.5 % (ref 38–47)
HDLC SERPL-MCNC: 96 MG/DL (ref 40–60)
HGB BLD-MCNC: 13.4 G/DL (ref 12–16)
IMM GRANULOCYTES # BLD AUTO: 0.03 K/UL (ref 0–0.04)
IMM GRANULOCYTES NFR BLD: 0.4 % (ref 0–0.4)
LDLC SERPL CALC-MCNC: 59 MG/DL
LYMPHOCYTES # BLD AUTO: 1.68 K/UL (ref 1–4.8)
LYMPHOCYTES NFR BLD AUTO: 21.1 % (ref 27–41)
MCH RBC QN AUTO: 30.5 PG (ref 27–31)
MCHC RBC AUTO-ENTMCNC: 32.3 G/DL (ref 32–36)
MCV RBC AUTO: 94.5 FL (ref 80–96)
MONOCYTES # BLD AUTO: 0.62 K/UL (ref 0–0.8)
MONOCYTES NFR BLD AUTO: 7.8 % (ref 2–6)
MPC BLD CALC-MCNC: 13 FL (ref 9.4–12.4)
NEUTROPHILS # BLD AUTO: 5.38 K/UL (ref 1.8–7.7)
NEUTROPHILS NFR BLD AUTO: 67.5 % (ref 53–65)
NONHDLC SERPL-MCNC: 74 MG/DL
NRBC # BLD AUTO: 0 X10E3/UL
NRBC, AUTO (.00): 0 %
PLATELET # BLD AUTO: 267 K/UL (ref 150–400)
POTASSIUM SERPL-SCNC: 4.3 MMOL/L (ref 3.5–5.1)
PROT SERPL-MCNC: 7 G/DL (ref 6.4–8.2)
PTH-INTACT SERPL-MCNC: 28.8 PG/ML (ref 18.4–80.1)
RBC # BLD AUTO: 4.39 M/UL (ref 4.2–5.4)
SODIUM SERPL-SCNC: 139 MMOL/L (ref 136–145)
T3FREE SERPL-MCNC: 2.1 PG/ML (ref 2.18–3.98)
T4 FREE SERPL-MCNC: 1.44 NG/DL (ref 0.76–1.46)
THYROPEROXIDASE AB SERPL-ACNC: 736 U/ML (ref 0–60)
TRIGL SERPL-MCNC: 77 MG/DL (ref 35–150)
TSH SERPL DL<=0.005 MIU/L-ACNC: 3.99 UIU/ML (ref 0.36–3.74)
VLDLC SERPL-MCNC: 15 MG/DL
WBC # BLD AUTO: 7.96 K/UL (ref 4.5–11)

## 2024-01-10 PROCEDURE — 84481 FREE ASSAY (FT-3): CPT | Mod: ,,, | Performed by: CLINICAL MEDICAL LABORATORY

## 2024-01-10 PROCEDURE — 86376 MICROSOMAL ANTIBODY EACH: CPT | Mod: ,,, | Performed by: CLINICAL MEDICAL LABORATORY

## 2024-01-10 PROCEDURE — 99214 OFFICE O/P EST MOD 30 MIN: CPT | Mod: ,,, | Performed by: NURSE PRACTITIONER

## 2024-01-10 PROCEDURE — 84443 ASSAY THYROID STIM HORMONE: CPT | Mod: ,,, | Performed by: CLINICAL MEDICAL LABORATORY

## 2024-01-10 PROCEDURE — 85025 COMPLETE CBC W/AUTO DIFF WBC: CPT | Mod: ,,, | Performed by: CLINICAL MEDICAL LABORATORY

## 2024-01-10 PROCEDURE — 80061 LIPID PANEL: CPT | Mod: ,,, | Performed by: CLINICAL MEDICAL LABORATORY

## 2024-01-10 PROCEDURE — 84439 ASSAY OF FREE THYROXINE: CPT | Mod: ,,, | Performed by: CLINICAL MEDICAL LABORATORY

## 2024-01-10 PROCEDURE — 83036 HEMOGLOBIN GLYCOSYLATED A1C: CPT | Mod: ,,, | Performed by: CLINICAL MEDICAL LABORATORY

## 2024-01-10 PROCEDURE — 83970 ASSAY OF PARATHORMONE: CPT | Mod: ,,, | Performed by: CLINICAL MEDICAL LABORATORY

## 2024-01-10 PROCEDURE — 80053 COMPREHEN METABOLIC PANEL: CPT | Mod: ,,, | Performed by: CLINICAL MEDICAL LABORATORY

## 2024-01-10 RX ORDER — CEFUROXIME AXETIL 250 MG/1
12 TABLET ORAL
Qty: 12 ML | Refills: 2 | Status: SHIPPED | OUTPATIENT
Start: 2024-01-10

## 2024-01-10 RX ORDER — PROPRANOLOL HYDROCHLORIDE 120 MG/1
120 CAPSULE, EXTENDED RELEASE ORAL DAILY
Qty: 90 CAPSULE | Refills: 3 | Status: SHIPPED | OUTPATIENT
Start: 2024-01-10

## 2024-01-10 RX ORDER — SUMATRIPTAN SUCCINATE 100 MG/1
TABLET ORAL
Qty: 18 TABLET | Refills: 3 | Status: SHIPPED | OUTPATIENT
Start: 2024-01-10

## 2024-01-10 RX ORDER — ERGOCALCIFEROL 1.25 MG/1
50000 CAPSULE ORAL
Qty: 12 CAPSULE | Refills: 3 | Status: SHIPPED | OUTPATIENT
Start: 2024-01-10

## 2024-01-10 RX ORDER — LEVOTHYROXINE SODIUM 100 UG/1
100 TABLET ORAL DAILY
Qty: 90 TABLET | Refills: 3 | Status: SHIPPED | OUTPATIENT
Start: 2024-01-10

## 2024-01-10 RX ORDER — ATORVASTATIN CALCIUM 10 MG/1
10 TABLET, FILM COATED ORAL DAILY
Qty: 90 TABLET | Refills: 3 | Status: SHIPPED | OUTPATIENT
Start: 2024-01-10

## 2024-01-31 ENCOUNTER — EXTERNAL CHRONIC CARE MANAGEMENT (OUTPATIENT)
Dept: FAMILY MEDICINE | Facility: CLINIC | Age: 74
End: 2024-01-31
Payer: MEDICARE

## 2024-01-31 PROCEDURE — G0511 CCM/BHI BY RHC/FQHC 20MIN MO: HCPCS | Mod: ,,, | Performed by: NURSE PRACTITIONER

## 2024-02-13 ENCOUNTER — HOSPITAL ENCOUNTER (OUTPATIENT)
Dept: RADIOLOGY | Facility: HOSPITAL | Age: 74
Discharge: HOME | End: 2024-02-13
Attending: NURSE PRACTITIONER
Payer: MEDICARE

## 2024-02-13 VITALS — WEIGHT: 152 LBS | BODY MASS INDEX: 23.86 KG/M2 | HEIGHT: 67 IN

## 2024-02-13 DIAGNOSIS — Z12.31 ENCOUNTER FOR SCREENING MAMMOGRAM FOR MALIGNANT NEOPLASM OF BREAST: ICD-10-CM

## 2024-02-13 PROCEDURE — 77067 SCR MAMMO BI INCL CAD: CPT | Mod: TC

## 2024-02-29 ENCOUNTER — EXTERNAL CHRONIC CARE MANAGEMENT (OUTPATIENT)
Dept: FAMILY MEDICINE | Facility: CLINIC | Age: 74
End: 2024-02-29
Payer: MEDICARE

## 2024-02-29 PROCEDURE — G0511 CCM/BHI BY RHC/FQHC 20MIN MO: HCPCS | Mod: ,,, | Performed by: NURSE PRACTITIONER

## 2024-03-31 ENCOUNTER — EXTERNAL CHRONIC CARE MANAGEMENT (OUTPATIENT)
Dept: FAMILY MEDICINE | Facility: CLINIC | Age: 74
End: 2024-03-31
Payer: MEDICARE

## 2024-03-31 PROCEDURE — G0511 CCM/BHI BY RHC/FQHC 20MIN MO: HCPCS | Mod: ,,, | Performed by: NURSE PRACTITIONER

## 2024-05-15 ENCOUNTER — OFFICE VISIT (OUTPATIENT)
Dept: FAMILY MEDICINE | Facility: CLINIC | Age: 74
End: 2024-05-15
Payer: MEDICARE

## 2024-05-15 VITALS
DIASTOLIC BLOOD PRESSURE: 65 MMHG | BODY MASS INDEX: 24.33 KG/M2 | SYSTOLIC BLOOD PRESSURE: 127 MMHG | WEIGHT: 155 LBS | RESPIRATION RATE: 18 BRPM | OXYGEN SATURATION: 97 % | HEIGHT: 67 IN | HEART RATE: 54 BPM

## 2024-05-15 DIAGNOSIS — I49.5 SICK SINUS SYNDROME: ICD-10-CM

## 2024-05-15 DIAGNOSIS — Z87.891 PERSONAL HISTORY OF NICOTINE DEPENDENCE: ICD-10-CM

## 2024-05-15 DIAGNOSIS — E78.5 HYPERLIPIDEMIA, UNSPECIFIED HYPERLIPIDEMIA TYPE: ICD-10-CM

## 2024-05-15 DIAGNOSIS — Z90.710 H/O TOTAL HYSTERECTOMY: ICD-10-CM

## 2024-05-15 DIAGNOSIS — Z00.00 ENCOUNTER FOR SUBSEQUENT ANNUAL WELLNESS VISIT (AWV) IN MEDICARE PATIENT: Primary | ICD-10-CM

## 2024-05-15 DIAGNOSIS — E03.9 HYPOTHYROIDISM, UNSPECIFIED TYPE: ICD-10-CM

## 2024-05-15 DIAGNOSIS — I10 HYPERTENSION, UNSPECIFIED TYPE: ICD-10-CM

## 2024-05-15 DIAGNOSIS — J44.9 CHRONIC OBSTRUCTIVE PULMONARY DISEASE, UNSPECIFIED COPD TYPE: ICD-10-CM

## 2024-05-15 PROCEDURE — G0439 PPPS, SUBSEQ VISIT: HCPCS | Mod: ,,, | Performed by: NURSE PRACTITIONER

## 2024-05-15 RX ORDER — CEFUROXIME AXETIL 250 MG/1
12 TABLET ORAL
Qty: 12 ML | Refills: 2 | Status: SHIPPED | OUTPATIENT
Start: 2024-05-15

## 2024-05-15 RX ORDER — SUMATRIPTAN SUCCINATE 100 MG/1
TABLET ORAL
Qty: 18 TABLET | Refills: 0 | Status: SHIPPED | OUTPATIENT
Start: 2024-05-15

## 2024-05-15 RX ORDER — SUMATRIPTAN SUCCINATE 100 MG/1
TABLET ORAL
Qty: 18 TABLET | Refills: 3 | Status: SHIPPED | OUTPATIENT
Start: 2024-05-15 | End: 2024-05-15

## 2024-05-15 NOTE — PATIENT INSTRUCTIONS
Counseling and Referral of Other Preventative  (Italic type indicates deductible and co-insurance are waived)    Patient Name: Norma Lopez  Today's Date: 5/15/2024    Health Maintenance       Date Due Completion Date    COVID-19 Vaccine (6 - 2023-24 season) 11/03/2024 (Originally 9/1/2023) 10/7/2022    LDCT Lung Screen 06/28/2024 6/28/2023    Mammogram 02/13/2025 2/13/2024    DEXA Scan 06/09/2027 6/9/2022    Lipid Panel 01/10/2029 1/10/2024    TETANUS VACCINE 05/14/2034 5/14/2024        No orders of the defined types were placed in this encounter.    The following information is provided to all patients.  This information is to help you find resources for any of the problems found today that may be affecting your health:                  Living healthy guide: [facility].gov    Understanding Diabetes: www.diabetes.org      Eating healthy: www.cdc.gov/healthyweight      [facility] home safety checklist: www.cdc.gov/steadi/patient.html      Agency on Aging: [place].org    Alcoholics anonymous (AA): www.aa.org      Physical Activity: www.jennie.nih.gov/na3monv      Tobacco use: [facility] Health[facility]Liveealth.gov

## 2024-05-15 NOTE — PROGRESS NOTES
Patient is a former smoker, has a 20 pack year history, has no s/s of lung cancer, and is currently 74 years old.    Counseling and Shared Decision Making requirements:   * I have discussed the information and determined that the patient meets all of the criteria   *I have counseled the patient on the importance of smoking cessation   *I have provided to the patient smoking cessation counseling / abstinence counseling and provided patient with ways on how to quit   *Previous smoking cessation interventions include N/A .   *I have discussed with the patient the importance of annual screening if the patient continues to meet the above criteria.   * The patient has the following comorbid conditions that I am aware of that may affect screening, further testing, or treatment: hypertension       Opt out

## 2024-05-15 NOTE — PROGRESS NOTES
"   [facility]       PATIENT NAME: Norma Lopez   : 1950    AGE: 74 y.o. DATE: 05/15/2024   MRN: [id number]        Norma Lopez presented for a  Medicare AWV and comprehensive Health Risk Assessment today. The following components were reviewed and updated:    Medical history  Family History  Social history  Allergies and Current Medications  Health Risk Assessment  Health Maintenance  Care Team         ** See Completed Assessments for Annual Wellness Visit within the encounter summary.**         The following assessments were completed:  Living Situation  CAGE  Depression Screening  Timed Get Up and Go  Whisper Test  Cognitive Function Screening  Nutrition Screening  ADL Screening  PAQ Screening      Opioid documentation:      Patient does not have a current opioid prescription.        Vitals:    05/15/24 0913   BP: 127/65   BP Location: Left arm   Patient Position: Sitting   BP Method: Large (Automatic)   Pulse: (!) 54   Resp: 18   SpO2: 97%   Weight: 70.3 kg (155 lb)   Height: 5' 7" (1.702 m)     Body mass index is 24.28 kg/m².  Physical Exam  Vitals and nursing note reviewed.   Constitutional:       Appearance: Normal appearance. She is normal weight.   HENT:      Head: Normocephalic.      Nose: Nose normal.      Mouth/Throat:      Mouth: Mucous membranes are moist.   Eyes:      Pupils: Pupils are equal, round, and reactive to light.   Cardiovascular:      Rate and Rhythm: Normal rate and regular rhythm.      Pulses: Normal pulses.      Heart sounds: Normal heart sounds.   Pulmonary:      Effort: Pulmonary effort is normal.      Breath sounds: Normal breath sounds.   Abdominal:      General: Abdomen is flat. Bowel sounds are normal.      Palpations: Abdomen is soft.   Musculoskeletal:         General: Normal range of motion.      Cervical back: Normal range of motion and neck supple.   Skin:     General: Skin is warm and dry.      " Capillary Refill: Capillary refill takes less than 2 seconds.   Neurological:      General: No focal deficit present.      Mental Status: She is alert and oriented to person, place, and time. Mental status is at baseline.   Psychiatric:         Mood and Affect: Mood normal.         Behavior: Behavior normal.         Thought Content: Thought content normal.         Judgment: Judgment normal.              Diagnoses and health risks identified today and associated recommendations/orders:    1. Encounter for subsequent annual wellness visit (AWV) in Medicare patient  Gave appt reminder for next year's AWV    2. Hypertension, unspecified type  Controlled with medications     3. Hypothyroidism, unspecified type  Controlled with medications     4. Hyperlipidemia, unspecified hyperlipidemia type  Controlled with medications     5. Chronic obstructive pulmonary disease, unspecified COPD type  Controlled at this time. Followed by pulmonology yearly     6. Sick sinus syndrome  Followed by cardiology. Controlled.     7. Personal history of nicotine dependence  LDCT ordered    8. H/O total hysterectomy    9. BMI 24.0-24.9, adult      Provided Norma with a 5-10 year written screening schedule and personal prevention plan. Recommendations were developed using the USPSTF age appropriate recommendations. Education, counseling, and referrals were provided as needed. After Visit Summary printed and given to patient which includes a list of additional screenings\tests needed.    Follow up in about 1 year (around 5/15/2025) for AWV follow-up.    Signature:

## 2024-05-31 ENCOUNTER — EXTERNAL CHRONIC CARE MANAGEMENT (OUTPATIENT)
Dept: FAMILY MEDICINE | Facility: CLINIC | Age: 74
End: 2024-05-31
Payer: MEDICARE

## 2024-05-31 PROCEDURE — G0511 CCM/BHI BY RHC/FQHC 20MIN MO: HCPCS | Mod: ,,, | Performed by: NURSE PRACTITIONER

## 2024-06-05 ENCOUNTER — HOSPITAL ENCOUNTER (OUTPATIENT)
Dept: RADIOLOGY | Facility: HOSPITAL | Age: 74
Discharge: HOME | End: 2024-06-05
Attending: NURSE PRACTITIONER
Payer: MEDICARE

## 2024-06-05 VITALS — WEIGHT: 155 LBS | HEIGHT: 68 IN | BODY MASS INDEX: 23.49 KG/M2

## 2024-06-05 DIAGNOSIS — Z87.891 PERSONAL HISTORY OF NICOTINE DEPENDENCE: ICD-10-CM

## 2024-06-05 PROCEDURE — 71271 CT THORAX LUNG CANCER SCR C-: CPT | Mod: 26,,, | Performed by: STUDENT IN AN ORGANIZED HEALTH CARE EDUCATION/TRAINING PROGRAM

## 2024-06-05 PROCEDURE — 71271 CT THORAX LUNG CANCER SCR C-: CPT | Mod: TC

## 2024-06-07 ENCOUNTER — TELEPHONE (OUTPATIENT)
Dept: FAMILY MEDICINE | Facility: CLINIC | Age: 74
End: 2024-06-07
Payer: MEDICARE

## 2024-06-07 DIAGNOSIS — R91.1 PULMONARY NODULE: Primary | ICD-10-CM

## 2024-06-07 DIAGNOSIS — R91.8 ABNORMAL CT LUNG SCREENING: ICD-10-CM

## 2024-06-07 NOTE — TELEPHONE ENCOUNTER
----- Message from Janet Bermudez DNP sent at 6/5/2024  3:36 PM CDT -----  Has she followed up with pulmonology before?

## 2024-06-07 NOTE — TELEPHONE ENCOUNTER
Pt has never seen Pulmonary   Informed of results  Pt ok seeing pulmonary at [facility] pended order

## 2024-06-13 ENCOUNTER — OFFICE VISIT (OUTPATIENT)
Dept: PULMONOLOGY | Facility: CLINIC | Age: 74
End: 2024-06-13
Payer: MEDICARE

## 2024-06-13 VITALS
BODY MASS INDEX: 23.19 KG/M2 | RESPIRATION RATE: 16 BRPM | WEIGHT: 153 LBS | HEART RATE: 69 BPM | SYSTOLIC BLOOD PRESSURE: 136 MMHG | HEIGHT: 68 IN | DIASTOLIC BLOOD PRESSURE: 72 MMHG | OXYGEN SATURATION: 97 %

## 2024-06-13 DIAGNOSIS — F17.210 SMOKING GREATER THAN 30 PACK YEARS: Primary | ICD-10-CM

## 2024-06-13 DIAGNOSIS — R91.8 ABNORMAL CT LUNG SCREENING: ICD-10-CM

## 2024-06-13 DIAGNOSIS — R91.1 PULMONARY NODULE: ICD-10-CM

## 2024-06-13 PROBLEM — Z72.0 TOBACCO USE: Status: RESOLVED | Noted: 2022-05-10 | Resolved: 2024-06-13

## 2024-06-13 PROBLEM — Z87.891 PERSONAL HISTORY OF NICOTINE DEPENDENCE: Status: RESOLVED | Noted: 2022-05-10 | Resolved: 2024-06-13

## 2024-06-13 PROCEDURE — 99215 OFFICE O/P EST HI 40 MIN: CPT | Mod: PBBFAC | Performed by: STUDENT IN AN ORGANIZED HEALTH CARE EDUCATION/TRAINING PROGRAM

## 2024-06-13 PROCEDURE — 99999 PR PBB SHADOW E&M-EST. PATIENT-LVL V: CPT | Mod: PBBFAC,,, | Performed by: STUDENT IN AN ORGANIZED HEALTH CARE EDUCATION/TRAINING PROGRAM

## 2024-06-13 PROCEDURE — 99203 OFFICE O/P NEW LOW 30 MIN: CPT | Mod: S$PBB,,, | Performed by: STUDENT IN AN ORGANIZED HEALTH CARE EDUCATION/TRAINING PROGRAM

## 2024-06-13 NOTE — PROGRESS NOTES
"[facility]  Pulmonology  NEW VISIT     Patient Name:  Norma Lopez  Primary Care Provider: Janet Bermudez DNP  Date of Service: 2024  Reason for Referral:   R91.1 (ICD-10-CM) - Pulmonary nodule   R91.8 (ICD-10-CM) - Abnormal CT lung screening         Chief Complaint: "I have a nodule in my lung"    SUBJECTIVE   HPI:  Norma Lopez is a 74 y.o. female with HTN, thyroid dysfunction and prior nicotine dependence who presents today upon referral for pulmonary nodule.     Jessica reports feeling well.  She intermittently has coughing episodes that are not debilitating and typically nonproductive.  She has no limitations in her day-to-day activities.  She was previously used albuterol which has helped with her intermittent coughing episodes.  She states her symptoms are infrequent. She had COVID-19 with influenza B co-infection in 2022.  We reviewed the results of her LDCT.    Past Medical History:   Diagnosis Date    Hypertension     Migraines        Past Surgical History:   Procedure Laterality Date    HYSTERECTOMY      Total    OOPHORECTOMY         Family History   Problem Relation Name Age of Onset    Diabetes Mother      Stroke Father      Cancer Brother          Social History     Socioeconomic History    Marital status:    Tobacco Use    Smoking status: Former     Current packs/day: 0.00     Average packs/day: 0.5 packs/day for 49.0 years (24.5 ttl pk-yrs)     Types: Cigarettes     Start date:      Quit date: 2013     Years since quittin.4     Passive exposure: Never    Smokeless tobacco: Never   Substance and Sexual Activity    Alcohol use: Not Currently     Comment: Quit 30 years ago    Drug use: Never    Sexual activity: Yes     Social Determinants of Health     Financial Resource Strain: Low Risk  (5/15/2024)    Overall Financial Resource Strain (CARDIA)     Difficulty of Paying Living Expenses: Not hard at all   Food Insecurity: No Food Insecurity (5/15/2024)    " "Hunger Vital Sign     Worried About Running Out of Food in the Last Year: Never true     Ran Out of Food in the Last Year: Never true   Transportation Needs: No Transportation Needs (5/15/2024)    PRAPARE - Transportation     Lack of Transportation (Medical): No     Lack of Transportation (Non-Medical): No   Physical Activity: Inactive (5/15/2024)    Exercise Vital Sign     Days of Exercise per Week: 0 days     Minutes of Exercise per Session: 0 min   Stress: No Stress Concern Present (5/15/2024)    [employer] - Occupational Stress Questionnaire     Feeling of Stress : Not at all   Housing Stability: Low Risk  (5/15/2024)    Housing Stability Vital Sign     Unable to Pay for Housing in the Last Year: No     Homeless in the Last Year: No       Social History     Social History Narrative    Not on file       Review of patient's allergies indicates:  No Known Allergies     Medications: Medications reviewed to include over the counter medications.    Review of Systems: A focused ROS was completed and found to be negative except for that mentioned above.      OBJECTIVE   PHYSICAL EXAM:  Vitals:    06/13/24 1423   BP: 136/72   BP Location: Left arm   Patient Position: Sitting   BP Method: Large (Manual)   Pulse: 69   Resp: 16   SpO2: 97%   Weight: 69.4 kg (153 lb)   Height: 5' 8" (1.727 m)        GENERAL: NAD  HEENT: normocephalic, non-icteric conjunctivae, moist oral mucosa  RESPIRATORY: normal pulmonary effort, on room air  SKIN: no rash, jaundice, ecchymosis or ulcers  MUSCULOSKELETAL: No clubbing or cyanosis; no pedal edema  NEUROLOGIC: AO ×3, no gross deficits    LABS:  Lab studies reviewed and notable for CO2 27, SCr 0.79, H/H 13.4/41.5, SEos 100 (01/2024    IMAGING:  Imaging reviewed and notable for LD CT 06/2024 with 3 mm RUL and RML nodules noted by radiologist; on my review there are no masses, no pleural effusion, no mediastinal lymphadenopathy, there is interstitium enhancement in " bilateral lungs that is patchy consistent with prior infection foot, no honeycombing    ASSESSMENT & PLAN     1. Smoking greater than 30 pack years  Assessment & Plan:  Patient has a 30 pack-year smoking and quit in 2013 . We discussed lung cancer screening with low-dose CT including benefits (early diagnosis, reduced risk of death from lung cancer and decreased worry) and harms (false-positive findings, over-diagnosis, radiation exposure, increased anxiety) of screening and importance of adherence to the screening program with annual imaging at minimum. Patient is agreeable to lung cancer screening and to this end we will obtain a low-dose CT annual.   - LDCT in 1 year, due 06/2025      Orders:  -     CT Chest Lung Screening Low Dose; Future; Expected date: 06/13/2025    2. Pulmonary nodule  Assessment & Plan:  3 mm and stable x2 years. No further intervention required. Continue annual LDCT.    Orders:  -     Ambulatory referral/consult to Pulmonology    3. Abnormal CT lung screening  -     Ambulatory referral/consult to Pulmonology       For new or progressive symptoms, we will consider PFTs and initiation of inhaler therapy.    Follow up in about 1 year (around 6/13/2025).      Case was discussed with patient; all questions were answered to patient's satisfaction and patient verbalized understanding.       Deborah Singer MD  Pulmonary Medicine  [facility]  Phone: [phone]

## 2024-06-13 NOTE — ASSESSMENT & PLAN NOTE
Patient has a 30 pack-year smoking and quit in 2013 . We discussed lung cancer screening with low-dose CT including benefits (early diagnosis, reduced risk of death from lung cancer and decreased worry) and harms (false-positive findings, over-diagnosis, radiation exposure, increased anxiety) of screening and importance of adherence to the screening program with annual imaging at minimum. Patient is agreeable to lung cancer screening and to this end we will obtain a low-dose CT annual.   - LDCT in 1 year, due 06/2025

## 2024-06-30 ENCOUNTER — EXTERNAL CHRONIC CARE MANAGEMENT (OUTPATIENT)
Dept: FAMILY MEDICINE | Facility: CLINIC | Age: 74
End: 2024-06-30
Payer: MEDICARE

## 2024-06-30 PROCEDURE — G0511 CCM/BHI BY RHC/FQHC 20MIN MO: HCPCS | Mod: ,,, | Performed by: NURSE PRACTITIONER

## 2024-07-10 ENCOUNTER — OFFICE VISIT (OUTPATIENT)
Dept: FAMILY MEDICINE | Facility: CLINIC | Age: 74
End: 2024-07-10
Payer: MEDICARE

## 2024-07-10 VITALS
DIASTOLIC BLOOD PRESSURE: 51 MMHG | HEART RATE: 67 BPM | SYSTOLIC BLOOD PRESSURE: 126 MMHG | TEMPERATURE: 98 F | OXYGEN SATURATION: 97 % | HEIGHT: 68 IN | WEIGHT: 152.63 LBS | BODY MASS INDEX: 23.13 KG/M2

## 2024-07-10 DIAGNOSIS — R73.9 HYPERGLYCEMIA: ICD-10-CM

## 2024-07-10 DIAGNOSIS — E78.5 HYPERLIPIDEMIA, UNSPECIFIED HYPERLIPIDEMIA TYPE: ICD-10-CM

## 2024-07-10 DIAGNOSIS — Z79.899 LONG TERM CURRENT USE OF THERAPEUTIC DRUG: ICD-10-CM

## 2024-07-10 DIAGNOSIS — I10 HYPERTENSION, UNSPECIFIED TYPE: Primary | ICD-10-CM

## 2024-07-10 DIAGNOSIS — J44.9 CHRONIC OBSTRUCTIVE PULMONARY DISEASE, UNSPECIFIED COPD TYPE: ICD-10-CM

## 2024-07-10 DIAGNOSIS — E03.9 HYPOTHYROIDISM, UNSPECIFIED TYPE: ICD-10-CM

## 2024-07-10 LAB
25(OH)D3 SERPL-MCNC: 165.6 NG/ML
ALBUMIN SERPL BCP-MCNC: 3.7 G/DL (ref 3.5–5)
ALBUMIN/GLOB SERPL: 1.2 {RATIO}
ALP SERPL-CCNC: 60 U/L (ref 55–142)
ALT SERPL W P-5'-P-CCNC: 15 U/L (ref 13–56)
ANION GAP SERPL CALCULATED.3IONS-SCNC: 10 MMOL/L (ref 7–16)
AST SERPL W P-5'-P-CCNC: 13 U/L (ref 15–37)
BASOPHILS # BLD AUTO: 0.16 K/UL (ref 0–0.2)
BASOPHILS NFR BLD AUTO: 2.5 % (ref 0–1)
BILIRUB SERPL-MCNC: 0.3 MG/DL (ref ?–1.2)
BUN SERPL-MCNC: 7 MG/DL (ref 7–18)
BUN/CREAT SERPL: 9 (ref 6–20)
CALCIUM SERPL-MCNC: 9.7 MG/DL (ref 8.5–10.1)
CHLORIDE SERPL-SCNC: 104 MMOL/L (ref 98–107)
CHOLEST SERPL-MCNC: 157 MG/DL (ref 0–200)
CHOLEST/HDLC SERPL: 2 {RATIO}
CO2 SERPL-SCNC: 29 MMOL/L (ref 21–32)
CREAT SERPL-MCNC: 0.78 MG/DL (ref 0.55–1.02)
CREAT UR-MCNC: 49 MG/DL (ref 28–219)
DIFFERENTIAL METHOD BLD: ABNORMAL
EGFR (NO RACE VARIABLE) (RUSH/TITUS): 79 ML/MIN/1.73M2
EOSINOPHIL # BLD AUTO: 0.48 K/UL (ref 0–0.5)
EOSINOPHIL NFR BLD AUTO: 7.4 % (ref 1–4)
ERYTHROCYTE [DISTWIDTH] IN BLOOD BY AUTOMATED COUNT: 12.7 % (ref 11.5–14.5)
EST. AVERAGE GLUCOSE BLD GHB EST-MCNC: 105 MG/DL
FOLATE SERPL-MCNC: >20 NG/ML (ref 3.1–17.5)
GLOBULIN SER-MCNC: 3 G/DL (ref 2–4)
GLUCOSE SERPL-MCNC: 108 MG/DL (ref 74–106)
HBA1C MFR BLD HPLC: 5.3 % (ref 4.5–6.6)
HCT VFR BLD AUTO: 42.1 % (ref 38–47)
HDLC SERPL-MCNC: 79 MG/DL (ref 40–60)
HGB BLD-MCNC: 12.9 G/DL (ref 12–16)
IMM GRANULOCYTES # BLD AUTO: 0.03 K/UL (ref 0–0.04)
IMM GRANULOCYTES NFR BLD: 0.5 % (ref 0–0.4)
LDLC SERPL CALC-MCNC: 64 MG/DL
LYMPHOCYTES # BLD AUTO: 1.33 K/UL (ref 1–4.8)
LYMPHOCYTES NFR BLD AUTO: 20.5 % (ref 27–41)
MCH RBC QN AUTO: 29.5 PG (ref 27–31)
MCHC RBC AUTO-ENTMCNC: 30.6 G/DL (ref 32–36)
MCV RBC AUTO: 96.1 FL (ref 80–96)
MICROALBUMIN UR-MCNC: 0.6 MG/DL (ref 0–2.8)
MICROALBUMIN/CREAT RATIO PNL UR: 12.2 MG/G (ref 0–30)
MONOCYTES # BLD AUTO: 0.59 K/UL (ref 0–0.8)
MONOCYTES NFR BLD AUTO: 9.1 % (ref 2–6)
MPC BLD CALC-MCNC: 13.3 FL (ref 9.4–12.4)
NEUTROPHILS # BLD AUTO: 3.9 K/UL (ref 1.8–7.7)
NEUTROPHILS NFR BLD AUTO: 60 % (ref 53–65)
NONHDLC SERPL-MCNC: 78 MG/DL
NRBC # BLD AUTO: 0 X10E3/UL
NRBC, AUTO (.00): 0 %
PLATELET # BLD AUTO: 264 K/UL (ref 150–400)
PLATELET MORPHOLOGY: ABNORMAL
POTASSIUM SERPL-SCNC: 4.7 MMOL/L (ref 3.5–5.1)
PROT SERPL-MCNC: 6.7 G/DL (ref 6.4–8.2)
PTH-INTACT SERPL-MCNC: 26.6 PG/ML (ref 18.4–80.1)
RBC # BLD AUTO: 4.38 M/UL (ref 4.2–5.4)
RBC MORPH BLD: NORMAL
SODIUM SERPL-SCNC: 138 MMOL/L (ref 136–145)
T3FREE SERPL-MCNC: 2.11 PG/ML (ref 2.18–3.98)
T4 FREE SERPL-MCNC: 1.49 NG/DL (ref 0.76–1.46)
THYROPEROXIDASE AB SERPL-ACNC: 615 U/ML (ref 0–60)
TRIGL SERPL-MCNC: 71 MG/DL (ref 35–150)
TSH SERPL DL<=0.005 MIU/L-ACNC: 0.8 UIU/ML (ref 0.36–3.74)
VIT B12 SERPL-MCNC: 5919 PG/ML (ref 193–986)
VLDLC SERPL-MCNC: 14 MG/DL
WBC # BLD AUTO: 6.49 K/UL (ref 4.5–11)

## 2024-07-10 PROCEDURE — 82570 ASSAY OF URINE CREATININE: CPT | Mod: ,,, | Performed by: CLINICAL MEDICAL LABORATORY

## 2024-07-10 PROCEDURE — 82746 ASSAY OF FOLIC ACID SERUM: CPT | Mod: ,,, | Performed by: CLINICAL MEDICAL LABORATORY

## 2024-07-10 PROCEDURE — 83036 HEMOGLOBIN GLYCOSYLATED A1C: CPT | Mod: ,,, | Performed by: CLINICAL MEDICAL LABORATORY

## 2024-07-10 PROCEDURE — 80053 COMPREHEN METABOLIC PANEL: CPT | Mod: ,,, | Performed by: CLINICAL MEDICAL LABORATORY

## 2024-07-10 PROCEDURE — 82043 UR ALBUMIN QUANTITATIVE: CPT | Mod: ,,, | Performed by: CLINICAL MEDICAL LABORATORY

## 2024-07-10 PROCEDURE — 85025 COMPLETE CBC W/AUTO DIFF WBC: CPT | Mod: ,,, | Performed by: CLINICAL MEDICAL LABORATORY

## 2024-07-10 PROCEDURE — 84481 FREE ASSAY (FT-3): CPT | Mod: ,,, | Performed by: CLINICAL MEDICAL LABORATORY

## 2024-07-10 PROCEDURE — 82607 VITAMIN B-12: CPT | Mod: ,,, | Performed by: CLINICAL MEDICAL LABORATORY

## 2024-07-10 PROCEDURE — 84443 ASSAY THYROID STIM HORMONE: CPT | Mod: ,,, | Performed by: CLINICAL MEDICAL LABORATORY

## 2024-07-10 PROCEDURE — 84439 ASSAY OF FREE THYROXINE: CPT | Mod: ,,, | Performed by: CLINICAL MEDICAL LABORATORY

## 2024-07-10 PROCEDURE — 80061 LIPID PANEL: CPT | Mod: ,,, | Performed by: CLINICAL MEDICAL LABORATORY

## 2024-07-10 PROCEDURE — 99214 OFFICE O/P EST MOD 30 MIN: CPT | Mod: ,,, | Performed by: NURSE PRACTITIONER

## 2024-07-10 PROCEDURE — 82306 VITAMIN D 25 HYDROXY: CPT | Mod: ,,, | Performed by: CLINICAL MEDICAL LABORATORY

## 2024-07-10 PROCEDURE — 83970 ASSAY OF PARATHORMONE: CPT | Mod: ,,, | Performed by: CLINICAL MEDICAL LABORATORY

## 2024-07-10 PROCEDURE — 86376 MICROSOMAL ANTIBODY EACH: CPT | Mod: ,,, | Performed by: CLINICAL MEDICAL LABORATORY

## 2024-07-10 RX ORDER — SUMATRIPTAN SUCCINATE 100 MG/1
TABLET ORAL
Qty: 18 TABLET | Refills: 11 | Status: SHIPPED | OUTPATIENT
Start: 2024-07-10

## 2024-07-10 NOTE — PROGRESS NOTES
"   Janet Bermudez DNP   [address] [place] [address]     PATIENT NAME: Norma Lopez  : 1950  DATE: 7/10/24  MRN: [id number]      Billing Provider: Janet Bermudez DNP  Level of Service:   Patient PCP Information       Provider PCP Type    Janet Bermudez DNP General            Reason for Visit / Chief Complaint: Hyperlipidemia, Hypertension, Thyroid Problem, and knot on face       Update PCP  Update Chief Complaint         History of Present Illness / Problem Focused Workflow     Norma Lopez presents to the clinic with Hyperlipidemia, Hypertension, Thyroid Problem, and knot on face     HPI    Review of Systems     Review of Systems     Medical / Social / Family History     Past Medical History:   Diagnosis Date    Hypertension     Migraines        Past Surgical History:   Procedure Laterality Date    HYSTERECTOMY      Total    OOPHORECTOMY         Social History  Ms.  reports that she quit smoking about 11 years ago. Her smoking use included cigarettes. She started smoking about 60 years ago. She has a 24.5 pack-year smoking history. She has never been exposed to tobacco smoke. She has never used smokeless tobacco. She reports that she does not currently use alcohol. She reports that she does not use drugs.    Family History  Ms.'s family history includes Cancer in her brother; Diabetes in her mother; Stroke in her father.    Medications and Allergies     Medications  No outpatient medications have been marked as taking for the 7/10/24 encounter (Office Visit) with Janet Bermudez DNP.       Allergies  Review of patient's allergies indicates:  No Known Allergies    Physical Examination   BP (!) 126/51   Pulse 67   Temp 98.2 °F (36.8 °C)   Ht 5' 8" (1.727 m)   Wt 69.2 kg (152 lb 9.6 oz)   SpO2 97%   BMI 23.20 kg/m²    Physical Exam  Vitals and nursing note reviewed.   Constitutional:       Appearance: Normal appearance. She is normal weight.   HENT:      Head: " Normocephalic.      Nose: Nose normal.      Mouth/Throat:      Mouth: Mucous membranes are moist.   Eyes:      Pupils: Pupils are equal, round, and reactive to light.   Cardiovascular:      Rate and Rhythm: Normal rate and regular rhythm.      Pulses: Normal pulses.      Heart sounds: Normal heart sounds.   Pulmonary:      Effort: Pulmonary effort is normal.      Breath sounds: Normal breath sounds.   Abdominal:      General: Abdomen is flat. Bowel sounds are normal.      Palpations: Abdomen is soft.   Musculoskeletal:         General: Normal range of motion.      Cervical back: Normal range of motion and neck supple.   Skin:     General: Skin is warm and dry.      Capillary Refill: Capillary refill takes less than 2 seconds.   Neurological:      General: No focal deficit present.      Mental Status: She is alert and oriented to person, place, and time. Mental status is at baseline.   Psychiatric:         Mood and Affect: Mood normal.         Behavior: Behavior normal.         Thought Content: Thought content normal.         Judgment: Judgment normal.        Assessment and Plan (including Health Maintenance)      Problem List  Smart Sets  Document Outside HM   :    Plan:     here for f/u appt     Continue current meds  Had LELIA/BSO in 89 and doesn't want paps.      c'scopy and egd in 10/2016 dr aguayo recc 10 years.. due 2026  MMG 2/2024  Dexa 6/2022 osical biflex      Will continue other meds. Labs today     Will see if aimovig covered. never got covered  optho -  2019 cataract sx dr lopez -- last seen 12/2023     flu - utd  covid vaccines - 2021 and booster.   pcv and pneumovax - 2013 and 2016 no longer needs.     saw cards at [facility] 2018/2019   Cards dr holden - has monitor implant 8/2022 - has yearly check ups 2/2024  Had vagal episodes was hospitalized but no issues since LOOP recorder was placed.  Vaccines utd.    There are no preventive care reminders to display for this patient.    Problem List Items  Addressed This Visit          Cardiac/Vascular    Hypertension - Primary    Relevant Orders    CBC Auto Differential    Comprehensive Metabolic Panel    Microalbumin/Creatinine Ratio, Urine    Hyperlipidemia    Relevant Orders    Lipid Panel       Endocrine    Hypothyroidism    Relevant Orders    Thyroid Peroxidase Antibody    PTH, Intact    T3, Free    Thyroid Panel    Hyperglycemia    Relevant Orders    Hemoglobin A1C       Palliative Care    Long term current use of therapeutic drug    Relevant Orders    Vitamin D    Vitamin B12 & Folate     Other Visit Diagnoses       Chronic obstructive pulmonary disease, unspecified COPD type                Health Maintenance Topics with due status: Not Due       Topic Last Completion Date    DEXA Scan 06/09/2022    Influenza Vaccine 09/28/2023    Lipid Panel 01/10/2024    Mammogram 02/13/2024    TETANUS VACCINE 05/14/2024    LDCT Lung Screen 06/05/2024       Future Appointments   Date Time Provider Department Center   2/18/2025 11:20 AM [facility] [place]   5/19/2025  9:00 AM AWV NURSE, [facility]ILY [facility]IC[facility] MATTHI[facility] [place] [facility]   6/16/2025 11:30 AM [facility] [place]   6/16/2025  1:00 PM [facility] Singer MD [facility]            Signature:  Janet Bermudez, DNP      [address] [place] [address]    Date of encounter: 7/10/24

## 2024-07-31 ENCOUNTER — EXTERNAL CHRONIC CARE MANAGEMENT (OUTPATIENT)
Dept: FAMILY MEDICINE | Facility: CLINIC | Age: 74
End: 2024-07-31
Payer: MEDICARE

## 2024-07-31 PROCEDURE — G0511 CCM/BHI BY RHC/FQHC 20MIN MO: HCPCS | Mod: ,,, | Performed by: NURSE PRACTITIONER

## 2024-08-19 PROBLEM — Z00.00 ENCOUNTER FOR SUBSEQUENT ANNUAL WELLNESS VISIT (AWV) IN MEDICARE PATIENT: Status: RESOLVED | Noted: 2021-11-08 | Resolved: 2024-08-19

## 2024-08-31 ENCOUNTER — EXTERNAL CHRONIC CARE MANAGEMENT (OUTPATIENT)
Dept: FAMILY MEDICINE | Facility: CLINIC | Age: 74
End: 2024-08-31
Payer: MEDICARE

## 2024-08-31 PROCEDURE — G0511 CCM/BHI BY RHC/FQHC 20MIN MO: HCPCS | Mod: ,,, | Performed by: NURSE PRACTITIONER

## 2024-09-30 ENCOUNTER — EXTERNAL CHRONIC CARE MANAGEMENT (OUTPATIENT)
Dept: FAMILY MEDICINE | Facility: CLINIC | Age: 74
End: 2024-09-30
Payer: MEDICARE

## 2024-09-30 PROCEDURE — G0511 CCM/BHI BY RHC/FQHC 20MIN MO: HCPCS | Mod: ,,, | Performed by: NURSE PRACTITIONER

## 2024-10-31 ENCOUNTER — EXTERNAL CHRONIC CARE MANAGEMENT (OUTPATIENT)
Dept: FAMILY MEDICINE | Facility: CLINIC | Age: 74
End: 2024-10-31
Payer: MEDICARE

## 2024-10-31 PROCEDURE — G0511 CCM/BHI BY RHC/FQHC 20MIN MO: HCPCS | Mod: ,,, | Performed by: NURSE PRACTITIONER

## 2024-11-05 ENCOUNTER — OFFICE VISIT (OUTPATIENT)
Dept: FAMILY MEDICINE | Facility: CLINIC | Age: 74
End: 2024-11-05
Payer: MEDICARE

## 2024-11-05 VITALS
SYSTOLIC BLOOD PRESSURE: 121 MMHG | HEART RATE: 70 BPM | TEMPERATURE: 98 F | WEIGHT: 155.38 LBS | HEIGHT: 67 IN | OXYGEN SATURATION: 97 % | BODY MASS INDEX: 24.39 KG/M2 | DIASTOLIC BLOOD PRESSURE: 73 MMHG

## 2024-11-05 DIAGNOSIS — R30.0 DYSURIA: ICD-10-CM

## 2024-11-05 DIAGNOSIS — N30.90 CYSTITIS: Primary | ICD-10-CM

## 2024-11-05 LAB
BILIRUB SERPL-MCNC: ABNORMAL MG/DL
BLOOD URINE, POC: ABNORMAL
CLARITY, UA: CLEAR
COLOR, UA: ABNORMAL
GLUCOSE UR QL STRIP: ABNORMAL
KETONES UR QL STRIP: ABNORMAL
LEUKOCYTE ESTERASE URINE, POC: ABNORMAL
NITRITE, POC UA: ABNORMAL
PH, POC UA: 6
PROTEIN, POC: ABNORMAL
SPECIFIC GRAVITY, POC UA: 1.01
UROBILINOGEN, POC UA: 0.2

## 2024-11-05 PROCEDURE — 81003 URINALYSIS AUTO W/O SCOPE: CPT | Mod: RHCUB | Performed by: NURSE PRACTITIONER

## 2024-11-05 PROCEDURE — 87086 URINE CULTURE/COLONY COUNT: CPT | Mod: ,,, | Performed by: CLINICAL MEDICAL LABORATORY

## 2024-11-05 PROCEDURE — 99213 OFFICE O/P EST LOW 20 MIN: CPT | Mod: ,,, | Performed by: NURSE PRACTITIONER

## 2024-11-05 RX ORDER — CEFUROXIME AXETIL 500 MG/1
500 TABLET ORAL EVERY 12 HOURS
Qty: 14 TABLET | Refills: 0 | Status: SHIPPED | OUTPATIENT
Start: 2024-11-05 | End: 2024-11-12

## 2024-11-05 NOTE — PROGRESS NOTES
"   Janet Bermudez DNP   [address] [place] [address]     PATIENT NAME: Norma Lopez  : 1950  DATE: 24  MRN: [id number]      Billing Provider: Janet Bermudez DNP  Level of Service:   Patient PCP Information       Provider PCP Type    Janet Bermudez DNP General            Reason for Visit / Chief Complaint: Urinary Tract Infection       Update PCP  Update Chief Complaint         History of Present Illness / Problem Focused Workflow     Norma Lopez presents to the clinic with Urinary Tract Infection     Urinary Tract Infection     Review of Systems     Review of Systems     Medical / Social / Family History     Past Medical History:   Diagnosis Date    Hypertension     Migraines        Past Surgical History:   Procedure Laterality Date    HYSTERECTOMY      Total    OOPHORECTOMY         Social History  Ms.  reports that she quit smoking about 11 years ago. Her smoking use included cigarettes. She started smoking about 60 years ago. She has a 24.5 pack-year smoking history. She has never been exposed to tobacco smoke. She has never used smokeless tobacco. She reports that she does not currently use alcohol. She reports that she does not use drugs.    Family History  Ms.'s family history includes Cancer in her brother; Diabetes in her mother; Stroke in her father.    Medications and Allergies     Medications  No outpatient medications have been marked as taking for the 24 encounter (Office Visit) with Janet Bermudez DNP.       Allergies  Review of patient's allergies indicates:  No Known Allergies    Physical Examination   /73 (BP Location: Right arm, Patient Position: Sitting)   Pulse 70   Temp 98.1 °F (36.7 °C)   Ht 5' 7" (1.702 m)   Wt 70.5 kg (155 lb 6.4 oz)   SpO2 97%   BMI 24.34 kg/m²    Physical Exam  Vitals and nursing note reviewed.   Constitutional:       Appearance: Normal appearance. She is normal weight.   HENT:      Head: " Normocephalic.      Nose: Nose normal.      Mouth/Throat:      Mouth: Mucous membranes are moist.   Eyes:      Extraocular Movements: Extraocular movements intact.      Conjunctiva/sclera: Conjunctivae normal.      Pupils: Pupils are equal, round, and reactive to light.   Cardiovascular:      Rate and Rhythm: Normal rate and regular rhythm.      Pulses: Normal pulses.      Heart sounds: Normal heart sounds.   Pulmonary:      Effort: Pulmonary effort is normal.      Breath sounds: Normal breath sounds.   Abdominal:      General: Abdomen is flat. Bowel sounds are normal.      Palpations: Abdomen is soft.      Tenderness: There is abdominal tenderness.   Musculoskeletal:         General: Normal range of motion.      Cervical back: Normal range of motion and neck supple.   Skin:     General: Skin is warm and dry.      Capillary Refill: Capillary refill takes less than 2 seconds.   Neurological:      General: No focal deficit present.      Mental Status: She is alert and oriented to person, place, and time. Mental status is at baseline.   Psychiatric:         Mood and Affect: Mood normal.         Behavior: Behavior normal.         Thought Content: Thought content normal.         Judgment: Judgment normal.        Assessment and Plan (including Health Maintenance)      Problem List  Smart Sets  Document Outside HM   :    Plan:         There are no preventive care reminders to display for this patient.    Problem List Items Addressed This Visit          Renal/    Cystitis - Primary    Dysuria    Relevant Orders    POCT URINALYSIS W/O SCOPE (Completed)    Urine culture       Endocrine    BMI 24.0-24.9, adult       Health Maintenance Topics with due status: Not Due       Topic Last Completion Date    DEXA Scan 06/09/2022    Mammogram 02/13/2024    TETANUS VACCINE 05/14/2024    LDCT Lung Screen 06/05/2024    Lipid Panel 07/10/2024       Future Appointments   Date Time Provider Department Center   11/5/2024  2:15 PM [facility]  Janet LOU DNP [facility]   2/18/2025 11:20 AM [facility] [place]   5/19/2025  9:00 AM AWV NURSE, [facility] FAMILY MEDICINE [facility] [place] [facility]   6/16/2025 11:30 AM [facility] CT1 [facility] [place]   6/16/2025  1:00 PM [facility]            Signature:  Janet Bermudez DNP      [address] [place] [address]    Date of encounter: 11/5/24

## 2024-11-07 LAB — UA COMPLETE W REFLEX CULTURE PNL UR: NORMAL

## 2024-11-30 ENCOUNTER — EXTERNAL CHRONIC CARE MANAGEMENT (OUTPATIENT)
Dept: FAMILY MEDICINE | Facility: CLINIC | Age: 74
End: 2024-11-30
Payer: MEDICARE

## 2024-11-30 PROCEDURE — G0511 CCM/BHI BY RHC/FQHC 20MIN MO: HCPCS | Mod: ,,, | Performed by: NURSE PRACTITIONER

## 2024-12-17 RX ORDER — ERGOCALCIFEROL 1.25 MG/1
50000 CAPSULE ORAL
Qty: 12 CAPSULE | Refills: 0 | Status: SHIPPED | OUTPATIENT
Start: 2024-12-17

## 2024-12-31 ENCOUNTER — EXTERNAL CHRONIC CARE MANAGEMENT (OUTPATIENT)
Dept: FAMILY MEDICINE | Facility: CLINIC | Age: 74
End: 2024-12-31
Payer: MEDICARE

## 2024-12-31 PROCEDURE — G0511 CCM/BHI BY RHC/FQHC 20MIN MO: HCPCS | Mod: ,,, | Performed by: NURSE PRACTITIONER

## 2025-01-06 RX ORDER — LEVOTHYROXINE SODIUM 100 UG/1
100 TABLET ORAL
Qty: 90 TABLET | Refills: 0 | Status: SHIPPED | OUTPATIENT
Start: 2025-01-06

## 2025-01-06 RX ORDER — PROPRANOLOL HYDROCHLORIDE 120 MG/1
120 CAPSULE, EXTENDED RELEASE ORAL
Qty: 90 CAPSULE | Refills: 0 | Status: SHIPPED | OUTPATIENT
Start: 2025-01-06

## 2025-01-17 RX ORDER — ATORVASTATIN CALCIUM 10 MG/1
10 TABLET, FILM COATED ORAL
Qty: 90 TABLET | Refills: 0 | Status: SHIPPED | OUTPATIENT
Start: 2025-01-17

## 2025-01-31 ENCOUNTER — EXTERNAL CHRONIC CARE MANAGEMENT (OUTPATIENT)
Dept: FAMILY MEDICINE | Facility: CLINIC | Age: 75
End: 2025-01-31
Payer: MEDICARE

## 2025-01-31 PROCEDURE — G0511 CCM/BHI BY RHC/FQHC 20MIN MO: HCPCS | Mod: ,,, | Performed by: NURSE PRACTITIONER

## 2025-02-18 ENCOUNTER — HOSPITAL ENCOUNTER (OUTPATIENT)
Dept: RADIOLOGY | Facility: HOSPITAL | Age: 75
Discharge: HOME | End: 2025-02-18
Attending: NURSE PRACTITIONER
Payer: MEDICARE

## 2025-02-18 VITALS — HEIGHT: 67 IN | WEIGHT: 150 LBS | BODY MASS INDEX: 23.54 KG/M2

## 2025-02-18 DIAGNOSIS — Z12.31 VISIT FOR SCREENING MAMMOGRAM: ICD-10-CM

## 2025-02-18 PROCEDURE — 77063 BREAST TOMOSYNTHESIS BI: CPT | Mod: TC

## 2025-02-28 ENCOUNTER — EXTERNAL CHRONIC CARE MANAGEMENT (OUTPATIENT)
Dept: FAMILY MEDICINE | Facility: CLINIC | Age: 75
End: 2025-02-28
Payer: MEDICARE

## 2025-02-28 PROCEDURE — G0511 CCM/BHI BY RHC/FQHC 20MIN MO: HCPCS | Mod: ,,, | Performed by: NURSE PRACTITIONER

## 2025-03-06 RX ORDER — ERGOCALCIFEROL 1.25 MG/1
50000 CAPSULE ORAL
Qty: 12 CAPSULE | Refills: 0 | Status: SHIPPED | OUTPATIENT
Start: 2025-03-06

## 2025-03-24 ENCOUNTER — HOSPITAL ENCOUNTER (OUTPATIENT)
Dept: RADIOLOGY | Facility: HOSPITAL | Age: 75
Discharge: HOME | End: 2025-03-24
Attending: RADIOLOGY
Payer: MEDICARE

## 2025-03-24 DIAGNOSIS — R92.8 ABNORMAL MAMMOGRAM: ICD-10-CM

## 2025-03-24 PROCEDURE — 76641 ULTRASOUND BREAST COMPLETE: CPT | Mod: TC,50

## 2025-03-24 PROCEDURE — 77061 BREAST TOMOSYNTHESIS UNI: CPT | Mod: 26,RT,, | Performed by: RADIOLOGY

## 2025-03-24 PROCEDURE — 77065 DX MAMMO INCL CAD UNI: CPT | Mod: 26,RT,, | Performed by: RADIOLOGY

## 2025-03-24 PROCEDURE — 77061 BREAST TOMOSYNTHESIS UNI: CPT | Mod: TC,RT

## 2025-03-24 PROCEDURE — 76641 ULTRASOUND BREAST COMPLETE: CPT | Mod: 26,50,, | Performed by: RADIOLOGY

## 2025-03-31 ENCOUNTER — EXTERNAL CHRONIC CARE MANAGEMENT (OUTPATIENT)
Dept: FAMILY MEDICINE | Facility: CLINIC | Age: 75
End: 2025-03-31
Payer: MEDICARE

## 2025-03-31 PROCEDURE — G0511 CCM/BHI BY RHC/FQHC 20MIN MO: HCPCS | Mod: ,,, | Performed by: NURSE PRACTITIONER

## 2025-04-04 RX ORDER — PROPRANOLOL HYDROCHLORIDE 120 MG/1
120 CAPSULE, EXTENDED RELEASE ORAL
Qty: 90 CAPSULE | Refills: 0 | Status: SHIPPED | OUTPATIENT
Start: 2025-04-04

## 2025-04-04 RX ORDER — LEVOTHYROXINE SODIUM 100 UG/1
100 TABLET ORAL
Qty: 90 TABLET | Refills: 0 | Status: SHIPPED | OUTPATIENT
Start: 2025-04-04

## 2025-04-10 ENCOUNTER — TELEPHONE (OUTPATIENT)
Dept: FAMILY MEDICINE | Facility: CLINIC | Age: 75
End: 2025-04-10
Payer: MEDICARE

## 2025-04-10 NOTE — TELEPHONE ENCOUNTER
----- Message from Tenisha sent at 4/10/2025  8:42 AM CDT -----  Who Called: Norma JESSIE Uribe is wanting to get her AWV rescheduled Preferred Method of Contact: Phone CallPatient's Preferred Phone Number on File: [phone] Best Call Back Number, if different:Additional Information:

## 2025-04-14 RX ORDER — ATORVASTATIN CALCIUM 10 MG/1
10 TABLET, FILM COATED ORAL
Qty: 90 TABLET | Refills: 0 | Status: SHIPPED | OUTPATIENT
Start: 2025-04-14

## 2025-04-15 ENCOUNTER — OFFICE VISIT (OUTPATIENT)
Dept: FAMILY MEDICINE | Facility: CLINIC | Age: 75
End: 2025-04-15
Payer: MEDICARE

## 2025-04-15 VITALS
WEIGHT: 145 LBS | HEART RATE: 62 BPM | TEMPERATURE: 97 F | DIASTOLIC BLOOD PRESSURE: 71 MMHG | SYSTOLIC BLOOD PRESSURE: 131 MMHG | OXYGEN SATURATION: 98 % | HEIGHT: 67 IN | BODY MASS INDEX: 22.76 KG/M2

## 2025-04-15 DIAGNOSIS — I10 HYPERTENSION, UNSPECIFIED TYPE: Primary | ICD-10-CM

## 2025-04-15 DIAGNOSIS — F17.210 SMOKING GREATER THAN 30 PACK YEARS: ICD-10-CM

## 2025-04-15 DIAGNOSIS — E03.9 HYPOTHYROIDISM, UNSPECIFIED TYPE: ICD-10-CM

## 2025-04-15 DIAGNOSIS — R73.9 HYPERGLYCEMIA: ICD-10-CM

## 2025-04-15 DIAGNOSIS — J44.9 CHRONIC OBSTRUCTIVE PULMONARY DISEASE, UNSPECIFIED COPD TYPE: ICD-10-CM

## 2025-04-15 DIAGNOSIS — Z79.899 LONG TERM CURRENT USE OF THERAPEUTIC DRUG: ICD-10-CM

## 2025-04-15 DIAGNOSIS — E78.5 HYPERLIPIDEMIA, UNSPECIFIED HYPERLIPIDEMIA TYPE: ICD-10-CM

## 2025-04-15 PROBLEM — R30.0 DYSURIA: Status: RESOLVED | Noted: 2024-11-05 | Resolved: 2025-04-15

## 2025-04-15 PROBLEM — R06.2 WHEEZING: Status: RESOLVED | Noted: 2023-11-30 | Resolved: 2025-04-15

## 2025-04-15 PROBLEM — N30.90 CYSTITIS: Status: RESOLVED | Noted: 2024-11-05 | Resolved: 2025-04-15

## 2025-04-15 PROBLEM — Z11.59 SCREENING FOR VIRAL DISEASE: Status: RESOLVED | Noted: 2022-05-10 | Resolved: 2025-04-15

## 2025-04-15 PROBLEM — R42 DIZZINESS: Status: RESOLVED | Noted: 2023-05-11 | Resolved: 2025-04-15

## 2025-04-15 PROBLEM — J40 BRONCHITIS: Status: RESOLVED | Noted: 2023-11-30 | Resolved: 2025-04-15

## 2025-04-15 PROBLEM — I49.5 SICK SINUS SYNDROME: Status: RESOLVED | Noted: 2024-01-10 | Resolved: 2025-04-15

## 2025-04-15 PROBLEM — R05.9 COUGH: Status: RESOLVED | Noted: 2022-06-29 | Resolved: 2025-04-15

## 2025-04-15 LAB
ALBUMIN SERPL BCP-MCNC: 3.9 G/DL (ref 3.4–4.8)
ALBUMIN/GLOB SERPL: 1.4 {RATIO}
ALP SERPL-CCNC: 45 U/L (ref 40–150)
ALT SERPL W P-5'-P-CCNC: 11 U/L
ANION GAP SERPL CALCULATED.3IONS-SCNC: 9 MMOL/L (ref 7–16)
AST SERPL W P-5'-P-CCNC: 17 U/L (ref 11–45)
BASOPHILS # BLD AUTO: 0.11 K/UL (ref 0–0.2)
BASOPHILS NFR BLD AUTO: 1.7 % (ref 0–1)
BILIRUB SERPL-MCNC: 0.3 MG/DL
BUN SERPL-MCNC: 10 MG/DL (ref 10–20)
BUN/CREAT SERPL: 14 (ref 6–20)
CALCIUM SERPL-MCNC: 9.5 MG/DL (ref 8.4–10.2)
CHLORIDE SERPL-SCNC: 104 MMOL/L (ref 98–107)
CHOLEST SERPL-MCNC: 156 MG/DL
CHOLEST/HDLC SERPL: 2.3 {RATIO}
CO2 SERPL-SCNC: 30 MMOL/L (ref 23–31)
CREAT SERPL-MCNC: 0.72 MG/DL (ref 0.55–1.02)
DIFFERENTIAL METHOD BLD: ABNORMAL
EGFR (NO RACE VARIABLE) (RUSH/TITUS): 86 ML/MIN/1.73M2
EOSINOPHIL # BLD AUTO: 0.11 K/UL (ref 0–0.5)
EOSINOPHIL NFR BLD AUTO: 1.7 % (ref 1–4)
ERYTHROCYTE [DISTWIDTH] IN BLOOD BY AUTOMATED COUNT: 12.4 % (ref 11.5–14.5)
EST. AVERAGE GLUCOSE BLD GHB EST-MCNC: 103 MG/DL
GLOBULIN SER-MCNC: 2.7 G/DL (ref 2–4)
GLUCOSE SERPL-MCNC: 80 MG/DL (ref 82–115)
HBA1C MFR BLD HPLC: 5.2 %
HCT VFR BLD AUTO: 37.5 % (ref 38–47)
HDLC SERPL-MCNC: 68 MG/DL (ref 35–60)
HGB BLD-MCNC: 11.9 G/DL (ref 12–16)
IMM GRANULOCYTES # BLD AUTO: 0.01 K/UL (ref 0–0.04)
IMM GRANULOCYTES NFR BLD: 0.2 % (ref 0–0.4)
LDLC SERPL CALC-MCNC: 71 MG/DL
LDLC/HDLC SERPL: 1 {RATIO}
LYMPHOCYTES # BLD AUTO: 2.08 K/UL (ref 1–4.8)
LYMPHOCYTES NFR BLD AUTO: 32.2 % (ref 27–41)
MCH RBC QN AUTO: 30.6 PG (ref 27–31)
MCHC RBC AUTO-ENTMCNC: 31.7 G/DL (ref 32–36)
MCV RBC AUTO: 96.4 FL (ref 80–96)
MONOCYTES # BLD AUTO: 0.58 K/UL (ref 0–0.8)
MONOCYTES NFR BLD AUTO: 9 % (ref 2–6)
MPC BLD CALC-MCNC: 13 FL (ref 9.4–12.4)
NEUTROPHILS # BLD AUTO: 3.56 K/UL (ref 1.8–7.7)
NEUTROPHILS NFR BLD AUTO: 55.2 % (ref 53–65)
NONHDLC SERPL-MCNC: 88 MG/DL
NRBC # BLD AUTO: 0 X10E3/UL
NRBC, AUTO (.00): 0 %
PLATELET # BLD AUTO: 227 K/UL (ref 150–400)
POTASSIUM SERPL-SCNC: 4.5 MMOL/L (ref 3.5–5.1)
PROT SERPL-MCNC: 6.6 G/DL (ref 5.8–7.6)
RBC # BLD AUTO: 3.89 M/UL (ref 4.2–5.4)
SODIUM SERPL-SCNC: 138 MMOL/L (ref 136–145)
T4 FREE SERPL-MCNC: 1.29 NG/DL (ref 0.7–1.48)
TRIGL SERPL-MCNC: 86 MG/DL (ref 37–140)
TSH SERPL DL<=0.005 MIU/L-ACNC: 0.18 UIU/ML (ref 0.35–4.94)
VLDLC SERPL-MCNC: 17 MG/DL
WBC # BLD AUTO: 6.45 K/UL (ref 4.5–11)

## 2025-04-15 PROCEDURE — 84443 ASSAY THYROID STIM HORMONE: CPT | Mod: ,,, | Performed by: CLINICAL MEDICAL LABORATORY

## 2025-04-15 PROCEDURE — 80053 COMPREHEN METABOLIC PANEL: CPT | Mod: ,,, | Performed by: CLINICAL MEDICAL LABORATORY

## 2025-04-15 PROCEDURE — 80061 LIPID PANEL: CPT | Mod: ,,, | Performed by: CLINICAL MEDICAL LABORATORY

## 2025-04-15 PROCEDURE — 83036 HEMOGLOBIN GLYCOSYLATED A1C: CPT | Mod: ,,, | Performed by: CLINICAL MEDICAL LABORATORY

## 2025-04-15 PROCEDURE — 85025 COMPLETE CBC W/AUTO DIFF WBC: CPT | Mod: ,,, | Performed by: CLINICAL MEDICAL LABORATORY

## 2025-04-15 PROCEDURE — 84439 ASSAY OF FREE THYROXINE: CPT | Mod: ,,, | Performed by: CLINICAL MEDICAL LABORATORY

## 2025-04-15 PROCEDURE — 99214 OFFICE O/P EST MOD 30 MIN: CPT | Mod: ,,, | Performed by: NURSE PRACTITIONER

## 2025-04-15 RX ORDER — SUMATRIPTAN SUCCINATE 100 MG/1
TABLET ORAL
Qty: 18 TABLET | Refills: 11 | Status: SHIPPED | OUTPATIENT
Start: 2025-04-15

## 2025-04-15 NOTE — PROGRESS NOTES
Janet Bermudez DNP   [address] [place] [address]     PATIENT NAME: Norma Lopez  : 1950  DATE: 4/15/25  MRN: [id number]      Billing Provider: Janet Bermudez DNP  Level of Service: MA OFFICE/OUTPT VISIT, EST, LEVL IV, 30-39 MIN  Patient PCP Information       Provider PCP Type    Janet Bermudez DNP General            Reason for Visit / Chief Complaint: Follow-up, Hypertension, Hyperlipidemia, Hypothyroidism, and Health Maintenance (..There are no preventive care reminders to display for this patient./)       Update PCP  Update Chief Complaint         History of Present Illness / Problem Focused Workflow     Norma Lopez presents to the clinic with Follow-up, Hypertension, Hyperlipidemia, Hypothyroidism, and Health Maintenance (..There are no preventive care reminders to display for this patient./)     HPI    Review of Systems     Review of Systems     Medical / Social / Family History     Past Medical History:   Diagnosis Date    Hyperlipidemia     Hypertension     Hypothyroidism     Migraines        Past Surgical History:   Procedure Laterality Date    HYSTERECTOMY      Total    OOPHORECTOMY         Social History  Ms.  reports that she quit smoking about 12 years ago. Her smoking use included cigarettes. She started smoking about 61 years ago. She has a 24.5 pack-year smoking history. She has never been exposed to tobacco smoke. She has never used smokeless tobacco. She reports that she does not currently use alcohol. She reports that she does not use drugs.    Family History  Ms.'s family history includes Cancer in her brother; Diabetes in her mother; Stroke in her father.    Medications and Allergies     Medications  Outpatient Medications Marked as Taking for the 4/15/25 encounter (Office Visit) with Janet Bermudez DNP   Medication Sig Dispense Refill    atorvastatin (LIPITOR) 10 MG tablet Take 1 tablet by mouth once daily 90 tablet 0    ergocalciferol  "(ERGOCALCIFEROL) 50,000 unit Cap Take 1 capsule by mouth once a week 12 capsule 0    levothyroxine (SYNTHROID) 100 MCG tablet Take 1 tablet by mouth once daily 90 tablet 0    propranoloL (INDERAL LA) 120 MG 24 hr capsule Take 1 capsule by mouth once daily 90 capsule 0    [DISCONTINUED] sumatriptan (IMITREX STATDOSE) 6 mg/0.5 mL kit Inject 1 mL (12 mg total) into the skin every 2 (two) hours as needed for Migraine. 12 mL 2    [DISCONTINUED] sumatriptan (IMITREX) 100 MG tablet NO MORE THAN 6 TABLETS PER DAY 18 tablet 11       Allergies  Review of patient's allergies indicates:  No Known Allergies    Physical Examination   /71 (BP Location: Left arm, Patient Position: Sitting)   Pulse 62   Temp 97.4 °F (36.3 °C) (Oral)   Ht 5' 7" (1.702 m)   Wt 65.8 kg (145 lb)   SpO2 98%   BMI 22.71 kg/m²    Physical Exam  Vitals and nursing note reviewed.   Constitutional:       Appearance: Normal appearance. She is normal weight.   HENT:      Head: Normocephalic.      Nose: Nose normal.      Mouth/Throat:      Mouth: Mucous membranes are moist.   Eyes:      Pupils: Pupils are equal, round, and reactive to light.   Cardiovascular:      Rate and Rhythm: Normal rate and regular rhythm.      Pulses: Normal pulses.      Heart sounds: Murmur heard.   Pulmonary:      Effort: Pulmonary effort is normal.      Breath sounds: Normal breath sounds.   Abdominal:      General: Abdomen is flat. Bowel sounds are normal.      Palpations: Abdomen is soft.   Musculoskeletal:         General: Normal range of motion.      Cervical back: Normal range of motion and neck supple.   Skin:     General: Skin is warm and dry.      Capillary Refill: Capillary refill takes less than 2 seconds.   Neurological:      General: No focal deficit present.      Mental Status: She is alert and oriented to person, place, and time. Mental status is at baseline.   Psychiatric:         Mood and Affect: Mood normal.         Behavior: Behavior normal.         " Thought Content: Thought content normal.         Judgment: Judgment normal.        Assessment and Plan (including Health Maintenance)      Problem List  Smart Sets  Document Outside HM   :    Plan:   here for f/u appt     Continue current meds  Had LELIA/BSO in 89 and doesn't want paps.      c'scopy and egd in 10/2016 dr aguayo [facility] 10 years.. due 2026  MMG 2025  Dexa 6/2022 osical biflex normal repeat 5 years 2027     Will continue other meds. Labs today       optho -  2019 cataract sx dr lopez -- last seen 12/2023     flu - utd  covid vaccines - 2021 and booster.   pcv and pneumovax - 2013 and 2016 no longer needs.     saw cards at [facility]   Cards dr holden - has monitor implant 8/2022 - has yearly check ups 4/2025  Recent stress test  Had vagal episodes was hospitalized but no issues since LOOP recorder was placed. 2022?  Vaccines utd.  Refills   Rouitne appt 6 months       There are no preventive care reminders to display for this patient.    Problem List Items Addressed This Visit          Pulmonary    Chronic obstructive pulmonary disease       Cardiac/Vascular    Hyperlipidemia    Relevant Orders    Lipid Panel    Hypertension - Primary    Relevant Orders    CBC Auto Differential    Comprehensive Metabolic Panel       Endocrine    Hyperglycemia    Relevant Orders    Hemoglobin A1C    Hypothyroidism    Relevant Orders    Thyroid Panel       Palliative Care    Long term current use of therapeutic drug       Other    Smoking greater than 30 pack years       Health Maintenance Topics with due status: Not Due       Topic Last Completion Date    DEXA Scan 06/09/2022    TETANUS VACCINE 05/14/2024    LDCT Lung Screen 06/05/2024    Lipid Panel 07/10/2024    Mammogram 03/24/2025       Future Appointments   Date Time Provider Department Center   6/16/2025 11:30 AM [facility] [place]   6/16/2025  1:00 PM Deborah Singer MD [facility]  [facility]   1/12/2026 11:00 AM [facility] NURSE, [facility] FAMILY MEDICINE [facility]  MATTHIAS [place]ago[place]   2/24/2026 11:20 AM [facility] MAMMO2 [facility] [place]            Signature:  Janet Bermudez, DNP      [address] [place] [address]    Date of encounter: 4/15/25

## 2025-04-16 ENCOUNTER — RESULTS FOLLOW-UP (OUTPATIENT)
Dept: FAMILY MEDICINE | Facility: CLINIC | Age: 75
End: 2025-04-16
Payer: MEDICARE

## 2025-04-18 RX ORDER — LEVOTHYROXINE SODIUM 88 UG/1
88 TABLET ORAL
Qty: 90 TABLET | Refills: 1 | Status: SHIPPED | OUTPATIENT
Start: 2025-04-18 | End: 2026-04-18

## 2025-04-30 ENCOUNTER — EXTERNAL CHRONIC CARE MANAGEMENT (OUTPATIENT)
Dept: FAMILY MEDICINE | Facility: CLINIC | Age: 75
End: 2025-04-30
Payer: MEDICARE

## 2025-04-30 PROCEDURE — 99490 CHRNC CARE MGMT STAFF 1ST 20: CPT | Mod: ,,, | Performed by: NURSE PRACTITIONER

## 2025-05-27 RX ORDER — ERGOCALCIFEROL 1.25 MG/1
50000 CAPSULE ORAL
Qty: 12 CAPSULE | Refills: 0 | Status: SHIPPED | OUTPATIENT
Start: 2025-05-27

## 2025-05-31 ENCOUNTER — EXTERNAL CHRONIC CARE MANAGEMENT (OUTPATIENT)
Dept: FAMILY MEDICINE | Facility: CLINIC | Age: 75
End: 2025-05-31
Payer: MEDICARE

## 2025-05-31 PROCEDURE — 99490 CHRNC CARE MGMT STAFF 1ST 20: CPT | Mod: ,,, | Performed by: NURSE PRACTITIONER

## 2025-06-16 ENCOUNTER — OFFICE VISIT (OUTPATIENT)
Dept: PULMONOLOGY | Facility: CLINIC | Age: 75
End: 2025-06-16
Payer: MEDICARE

## 2025-06-16 ENCOUNTER — HOSPITAL ENCOUNTER (OUTPATIENT)
Dept: RADIOLOGY | Facility: HOSPITAL | Age: 75
Discharge: HOME | End: 2025-06-16
Attending: STUDENT IN AN ORGANIZED HEALTH CARE EDUCATION/TRAINING PROGRAM
Payer: MEDICARE

## 2025-06-16 VITALS — WEIGHT: 142 LBS | HEIGHT: 68 IN | BODY MASS INDEX: 21.52 KG/M2

## 2025-06-16 VITALS
HEIGHT: 68 IN | DIASTOLIC BLOOD PRESSURE: 64 MMHG | OXYGEN SATURATION: 98 % | BODY MASS INDEX: 21.39 KG/M2 | HEART RATE: 62 BPM | RESPIRATION RATE: 16 BRPM | SYSTOLIC BLOOD PRESSURE: 108 MMHG | WEIGHT: 141.13 LBS

## 2025-06-16 DIAGNOSIS — F17.210 SMOKING GREATER THAN 30 PACK YEARS: ICD-10-CM

## 2025-06-16 DIAGNOSIS — F17.210 SMOKING GREATER THAN 30 PACK YEARS: Primary | ICD-10-CM

## 2025-06-16 DIAGNOSIS — R91.1 NODULE OF LEFT LUNG: ICD-10-CM

## 2025-06-16 PROCEDURE — 99214 OFFICE O/P EST MOD 30 MIN: CPT | Mod: S$PBB,,, | Performed by: STUDENT IN AN ORGANIZED HEALTH CARE EDUCATION/TRAINING PROGRAM

## 2025-06-16 PROCEDURE — 71271 CT THORAX LUNG CANCER SCR C-: CPT | Mod: TC

## 2025-06-16 PROCEDURE — 99999 PR PBB SHADOW E&M-EST. PATIENT-LVL IV: CPT | Mod: PBBFAC,,, | Performed by: STUDENT IN AN ORGANIZED HEALTH CARE EDUCATION/TRAINING PROGRAM

## 2025-06-16 PROCEDURE — 99214 OFFICE O/P EST MOD 30 MIN: CPT | Mod: PBBFAC,25 | Performed by: STUDENT IN AN ORGANIZED HEALTH CARE EDUCATION/TRAINING PROGRAM

## 2025-06-16 PROCEDURE — 71271 CT THORAX LUNG CANCER SCR C-: CPT | Mod: 26,,, | Performed by: INTERNAL MEDICINE

## 2025-06-16 NOTE — PROGRESS NOTES
[facility]  Pulmonology  NEW VISIT     Patient Name:  Norma Lopez  Primary Care Provider: Janet Bermudez DNP  Date of Service: 06/16/2025    Chief Complaint: CT review    SUBJECTIVE   HPI:  Norma Lopez is a 75 y.o. female with HTN, thyroid dysfunction and prior nicotine dependence who presents for routine follow up. Last seen 06/2024 with plan for annual LDCT.     Ms. Lopez presents for a follow-up visit to review lung imaging results. She reports having intermittent episodes of weakness and sweating accompanied by heaviness in her legs. These spells occur rarely, lasting only minutes, but are concerning to her. She had such an episode earlier today when walking into the radiology department. She recalls a similar incident at a [facility], where paramedics noted changes in her blood pressure and heart rate. Another occurrence at a hospital while getting labs done resulted in her being rushed to the emergency room. Medical professionals have been unable to identify the cause of these episodes. She has been evaluated by a cardiologist and has sought medical attention in various places for these symptoms, but no explanation has been provided. She has previously worn a Holter monitor and currently has a loop recorder implanted, which has not shown anything significant so far. She denies her heart racing during these episodes. We reviewed her LDCT.          Initial HPI  Jessica reports feeling well.  She intermittently has coughing episodes that are not debilitating and typically nonproductive.  She has no limitations in her day-to-day activities.  She was previously used albuterol which has helped with her intermittent coughing episodes.  She states her symptoms are infrequent. She had COVID-19 with influenza B co-infection in 07/2022.  We reviewed the results of her LDCT.    Past Medical History:   Diagnosis Date    Hyperlipidemia     Hypertension     Hypothyroidism     Migraines         Past Surgical History:   Procedure Laterality Date    HYSTERECTOMY      Total    OOPHORECTOMY         Family History   Problem Relation Name Age of Onset    Diabetes Mother      Stroke Father      Cancer Brother          Social History     Socioeconomic History    Marital status:    Tobacco Use    Smoking status: Former     Current packs/day: 0.00     Average packs/day: 0.5 packs/day for 49.0 years (24.5 ttl pk-yrs)     Types: Cigarettes     Start date:      Quit date:      Years since quittin.4     Passive exposure: Never    Smokeless tobacco: Never   Substance and Sexual Activity    Alcohol use: Not Currently     Comment: Quit 30 years ago    Drug use: Never    Sexual activity: Yes     Partners: Male     Social Drivers of Health     Financial Resource Strain: Low Risk  (5/15/2024)    Overall Financial Resource Strain (CARDIA)     Difficulty of Paying Living Expenses: Not hard at all   Food Insecurity: No Food Insecurity (5/15/2024)    Hunger Vital Sign     Worried About Running Out of Food in the Last Year: Never true     Ran Out of Food in the Last Year: Never true   Transportation Needs: No Transportation Needs (5/15/2024)    PRAPARE - Transportation     Lack of Transportation (Medical): No     Lack of Transportation (Non-Medical): No   Physical Activity: Inactive (5/15/2024)    Exercise Vital Sign     Days of Exercise per Week: 0 days     Minutes of Exercise per Session: 0 min   Stress: No Stress Concern Present (5/15/2024)    [employer] - Occupational Stress Questionnaire     Feeling of Stress : Not at all   Housing Stability: Unknown (5/15/2024)    Housing Stability Vital Sign     Unable to Pay for Housing in the Last Year: No     Homeless in the Last Year: No       Social History     Social History Narrative    Not on file       Review of patient's allergies indicates:  No Known Allergies     Medications: Medications reviewed to include over the  "counter medications.    Review of Systems: A focused ROS was completed and found to be negative except for that mentioned above.      OBJECTIVE   PHYSICAL EXAM:  Vitals:    06/16/25 1248   BP: 108/64   BP Location: Left arm   Patient Position: Sitting   Pulse: 62   Resp: 16   SpO2: 98%   Weight: 64 kg (141 lb 1.5 oz)   Height: 5' 8" (1.727 m)        GENERAL: NAD  HEENT: normocephalic, non-icteric conjunctivae  RESPIRATORY: normal pulmonary effort, on room air  MUSCULOSKELETAL: No clubbing or cyanosis  NEUROLOGIC: AO ×3, no gross deficits    LABS:  Lab studies reviewed and notable for CO2 27, SCr 0.79, H/H 13.4/41.5, SEos 100 (01/2024    IMAGING:  Imaging reviewed and notable for LD CT 06/2024 with 3 mm RUL and RML nodules noted by radiologist; on my review there are no masses, no pleural effusion, no mediastinal lymphadenopathy, there is interstitium enhancement in bilateral lungs that is patchy consistent with prior infection foot, no honeycombing    ASSESSMENT & PLAN     IMPRESSION:  Reviewed lung imaging, left upper lobe nodule vs airway inflammation present  Identified minimal emphysema, likely stable due to smoking cessation.  Considered reported episodes of weakness and sweating as possible vagal responses, noting normal but low-normal BP.  Awaiting radiologist's report for detailed analysis of any small nodules.    EMPHYSEMA:  - Explained appearance of lungs on CT, including visibility of airways and minimal emphysema.  - Ordered CT Chest in 6 months with follow up, potentially adjusting timing based on radiologist's recommendation.    CARDIAC IMPLANT (LOOP RECORDER):  - Ms. Lopez to contact cardiologist about recent symptomatic event that occurred during this visit, to be recorded on current loop recorder.  - Discussed nature of vagal responses and their potential symptoms.  - Follow up with cardiologist if episodes of feeling drained and sweating become more frequent, to potentially get a Holter monitor. "          1. Smoking greater than 30 pack years  -     CT Chest Lung Screening Low Dose; Future; Expected date: 06/16/2026    2. Nodule of left lung  -     CT Chest Without Contrast; Future; Expected date: 12/16/2025         This note was generated with the assistance of ambient listening technology. Verbal consent was obtained by the patient and accompanying visitor(s) for the recording of patient appointment to facilitate this note. I attest to having reviewed and edited the generated note for accuracy, though some syntax or spelling errors may persist. Please contact the author of this note for any clarification.       Follow up in about 1 year (around 6/16/2026) for Routine follow up.    Case was discussed with patient; all questions were answered to patient's satisfaction and patient verbalized understanding.     Deborah Singer MD  Pulmonary Medicine  [facility]  Phone: [phone]

## 2025-06-30 ENCOUNTER — EXTERNAL CHRONIC CARE MANAGEMENT (OUTPATIENT)
Dept: FAMILY MEDICINE | Facility: CLINIC | Age: 75
End: 2025-06-30
Payer: MEDICARE

## 2025-06-30 PROCEDURE — 99490 CHRNC CARE MGMT STAFF 1ST 20: CPT | Mod: ,,, | Performed by: NURSE PRACTITIONER

## 2025-07-01 RX ORDER — LEVOTHYROXINE SODIUM 100 UG/1
100 TABLET ORAL
Qty: 90 TABLET | Refills: 0 | OUTPATIENT
Start: 2025-07-01

## 2025-07-01 RX ORDER — PROPRANOLOL HYDROCHLORIDE 120 MG/1
120 CAPSULE, EXTENDED RELEASE ORAL
Qty: 90 CAPSULE | Refills: 0 | Status: SHIPPED | OUTPATIENT
Start: 2025-07-01

## 2025-07-15 RX ORDER — ATORVASTATIN CALCIUM 10 MG/1
10 TABLET, FILM COATED ORAL
Qty: 90 TABLET | Refills: 3 | Status: SHIPPED | OUTPATIENT
Start: 2025-07-15

## 2025-07-31 ENCOUNTER — EXTERNAL CHRONIC CARE MANAGEMENT (OUTPATIENT)
Dept: FAMILY MEDICINE | Facility: CLINIC | Age: 75
End: 2025-07-31
Payer: MEDICARE

## 2025-07-31 PROCEDURE — 99490 CHRNC CARE MGMT STAFF 1ST 20: CPT | Mod: ,,, | Performed by: NURSE PRACTITIONER

## 2025-08-15 RX ORDER — ERGOCALCIFEROL 1.25 MG/1
50000 CAPSULE ORAL
Qty: 12 CAPSULE | Refills: 0 | Status: SHIPPED | OUTPATIENT
Start: 2025-08-15